# Patient Record
Sex: FEMALE | Race: WHITE | NOT HISPANIC OR LATINO
[De-identification: names, ages, dates, MRNs, and addresses within clinical notes are randomized per-mention and may not be internally consistent; named-entity substitution may affect disease eponyms.]

---

## 2017-08-24 PROBLEM — Z00.00 ENCOUNTER FOR PREVENTIVE HEALTH EXAMINATION: Status: ACTIVE | Noted: 2017-08-24

## 2017-08-25 ENCOUNTER — APPOINTMENT (OUTPATIENT)
Dept: GASTROENTEROLOGY | Facility: CLINIC | Age: 82
End: 2017-08-25
Payer: MEDICARE

## 2017-08-25 VITALS
DIASTOLIC BLOOD PRESSURE: 72 MMHG | HEIGHT: 63 IN | TEMPERATURE: 98.1 F | BODY MASS INDEX: 25.69 KG/M2 | WEIGHT: 145 LBS | SYSTOLIC BLOOD PRESSURE: 130 MMHG

## 2017-08-25 DIAGNOSIS — Z63.4 DISAPPEARANCE AND DEATH OF FAMILY MEMBER: ICD-10-CM

## 2017-08-25 DIAGNOSIS — Z80.6 FAMILY HISTORY OF LEUKEMIA: ICD-10-CM

## 2017-08-25 DIAGNOSIS — M48.00 SPINAL STENOSIS, SITE UNSPECIFIED: ICD-10-CM

## 2017-08-25 DIAGNOSIS — Z86.39 PERSONAL HISTORY OF OTHER ENDOCRINE, NUTRITIONAL AND METABOLIC DISEASE: ICD-10-CM

## 2017-08-25 DIAGNOSIS — Z87.898 PERSONAL HISTORY OF OTHER SPECIFIED CONDITIONS: ICD-10-CM

## 2017-08-25 DIAGNOSIS — Z87.39 PERSONAL HISTORY OF OTHER DISEASES OF THE MUSCULOSKELETAL SYSTEM AND CONNECTIVE TISSUE: ICD-10-CM

## 2017-08-25 DIAGNOSIS — I25.10 ATHEROSCLEROTIC HEART DISEASE OF NATIVE CORONARY ARTERY W/OUT ANGINA PECTORIS: ICD-10-CM

## 2017-08-25 DIAGNOSIS — Z86.79 PERSONAL HISTORY OF OTHER DISEASES OF THE CIRCULATORY SYSTEM: ICD-10-CM

## 2017-08-25 DIAGNOSIS — Z87.440 PERSONAL HISTORY OF URINARY (TRACT) INFECTIONS: ICD-10-CM

## 2017-08-25 DIAGNOSIS — Z78.9 OTHER SPECIFIED HEALTH STATUS: ICD-10-CM

## 2017-08-25 PROCEDURE — 99204 OFFICE O/P NEW MOD 45 MIN: CPT

## 2017-08-25 RX ORDER — LORATADINE 5 MG
17 TABLET,CHEWABLE ORAL
Refills: 0 | Status: ACTIVE | COMMUNITY

## 2017-08-25 RX ORDER — PSYLLIUM SEED
PACKET (EA) ORAL
Refills: 0 | Status: ACTIVE | COMMUNITY

## 2017-08-25 RX ORDER — SODIUM PHOSPHATE, DIBASIC AND SODIUM PHOSPHATE, MONOBASIC 7; 19 G/230ML; G/230ML
ENEMA RECTAL
Refills: 0 | Status: ACTIVE | COMMUNITY

## 2017-08-25 SDOH — SOCIAL STABILITY - SOCIAL INSECURITY: DISSAPEARANCE AND DEATH OF FAMILY MEMBER: Z63.4

## 2017-09-11 ENCOUNTER — APPOINTMENT (OUTPATIENT)
Dept: GASTROENTEROLOGY | Facility: CLINIC | Age: 82
End: 2017-09-11
Payer: MEDICARE

## 2017-09-11 VITALS — TEMPERATURE: 98 F | HEIGHT: 63 IN | DIASTOLIC BLOOD PRESSURE: 80 MMHG | SYSTOLIC BLOOD PRESSURE: 160 MMHG

## 2017-09-11 PROCEDURE — 99214 OFFICE O/P EST MOD 30 MIN: CPT

## 2017-09-18 ENCOUNTER — APPOINTMENT (OUTPATIENT)
Dept: GASTROENTEROLOGY | Facility: CLINIC | Age: 82
End: 2017-09-18
Payer: MEDICARE

## 2017-09-18 VITALS
TEMPERATURE: 98.4 F | SYSTOLIC BLOOD PRESSURE: 142 MMHG | HEIGHT: 63 IN | WEIGHT: 145 LBS | BODY MASS INDEX: 25.69 KG/M2 | DIASTOLIC BLOOD PRESSURE: 70 MMHG

## 2017-09-18 DIAGNOSIS — K56.41 FECAL IMPACTION: ICD-10-CM

## 2017-09-18 DIAGNOSIS — R19.5 OTHER FECAL ABNORMALITIES: ICD-10-CM

## 2017-09-18 DIAGNOSIS — Z78.9 OTHER SPECIFIED HEALTH STATUS: ICD-10-CM

## 2017-09-18 DIAGNOSIS — K59.09 OTHER CONSTIPATION: ICD-10-CM

## 2017-09-18 DIAGNOSIS — R63.0 ANOREXIA: ICD-10-CM

## 2017-09-18 PROCEDURE — 99213 OFFICE O/P EST LOW 20 MIN: CPT

## 2017-09-18 RX ORDER — METOPROLOL TARTRATE 25 MG/1
25 TABLET, FILM COATED ORAL
Refills: 0 | Status: ACTIVE | COMMUNITY

## 2017-09-18 RX ORDER — LISINOPRIL 10 MG/1
10 TABLET ORAL
Refills: 0 | Status: ACTIVE | COMMUNITY

## 2017-09-18 RX ORDER — PRASUGREL HYDROCHLORIDE 5 MG/1
5 TABLET, COATED ORAL
Refills: 0 | Status: ACTIVE | COMMUNITY

## 2017-09-18 RX ORDER — MELOXICAM 15 MG/1
15 TABLET ORAL
Refills: 0 | Status: ACTIVE | COMMUNITY

## 2017-09-18 RX ORDER — ASPIRIN 81 MG/1
81 TABLET, COATED ORAL
Refills: 0 | Status: ACTIVE | COMMUNITY

## 2017-10-02 ENCOUNTER — OTHER (OUTPATIENT)
Age: 82
End: 2017-10-02

## 2017-10-04 ENCOUNTER — OTHER (OUTPATIENT)
Age: 82
End: 2017-10-04

## 2017-10-16 ENCOUNTER — APPOINTMENT (OUTPATIENT)
Dept: GASTROENTEROLOGY | Facility: CLINIC | Age: 82
End: 2017-10-16

## 2018-02-15 ENCOUNTER — APPOINTMENT (OUTPATIENT)
Dept: UROGYNECOLOGY | Facility: CLINIC | Age: 83
End: 2018-02-15

## 2018-07-28 PROBLEM — R19.5 CHANGE IN STOOL: Status: ACTIVE | Noted: 2017-08-25

## 2019-02-19 ENCOUNTER — APPOINTMENT (OUTPATIENT)
Dept: VASCULAR SURGERY | Facility: CLINIC | Age: 84
End: 2019-02-19

## 2019-02-21 ENCOUNTER — EMERGENCY (EMERGENCY)
Facility: HOSPITAL | Age: 84
LOS: 1 days | Discharge: HOME | End: 2019-02-21
Attending: EMERGENCY MEDICINE
Payer: MEDICARE

## 2019-02-21 VITALS
RESPIRATION RATE: 15 BRPM | DIASTOLIC BLOOD PRESSURE: 77 MMHG | TEMPERATURE: 98 F | HEART RATE: 71 BPM | WEIGHT: 160.06 LBS | OXYGEN SATURATION: 100 % | HEIGHT: 62 IN | SYSTOLIC BLOOD PRESSURE: 154 MMHG

## 2019-02-21 PROCEDURE — 87186 SC STD MICRODIL/AGAR DIL: CPT

## 2019-02-21 PROCEDURE — 99283 EMERGENCY DEPT VISIT LOW MDM: CPT

## 2019-02-21 PROCEDURE — 99283 EMERGENCY DEPT VISIT LOW MDM: CPT | Mod: 25

## 2019-02-21 PROCEDURE — 87086 URINE CULTURE/COLONY COUNT: CPT

## 2019-02-21 PROCEDURE — 51702 INSERT TEMP BLADDER CATH: CPT

## 2019-02-21 PROCEDURE — 81001 URINALYSIS AUTO W/SCOPE: CPT

## 2019-02-21 NOTE — ED PROVIDER NOTE - PMH
CAD (coronary artery disease)    DM (diabetes mellitus)    Rosario catheter in place    HTN (hypertension)    OA (osteoarthritis)

## 2019-02-21 NOTE — ED PROVIDER NOTE - NS ED ROS FT
CONSTITUTIONAL: no fever, no chills   EYES: no visual changes, no eye pain   ENMT: no nasal congestion, no throat pain  CARDIOVASCULAR: no chest pain, no edema, no palpitations   RESPIRATORY: no shortness of breath, no cough   GASTROINTESTINAL: +abdominal pain, no nausea, no vomiting, no diarrhea, no constipation   GENITOURINARY: no dysuria, no frequency, +urgency   MUSCULOSKELETAL: no joint pains, no myalgias, no back pain   SKIN: no rashes  NEUROLOGICAL: no weakness, no headache, no dizziness, no slurred speech, no syncope   PSYCHIATRIC: no known mental health illness   HEME/LYMPH: no lymphadenopathy      All other ROS negative except as per HPI

## 2019-02-21 NOTE — ED PROVIDER NOTE - PHYSICAL EXAMINATION
GENERAL: wells appearing, no acute distress   HEAD: atraumatic   EYES: EOMI, pink conjunctiva   ENT: moist oral mucosa   CARDIAC: RRR, no edema, distal pulses present   RESPIRATORY: lungs CTAB, no increased work of breathing   GASTROINTESTINAL: +mild suprapubic tenderness, no rebound or guarding, bowel sounds presents  GENITOURINARY: no CVA tenderness; Rosario cath in place with yellow urine; no clots or blood   MUSCULOSKELETAL: no deformity   NEUROLOGICAL: AAOx3, spontaneous movement of extremities   SKIN: intact   PSYCHIATRIC: cooperative  HEME LYMPH: no lymphadenopathy

## 2019-02-21 NOTE — ED ADULT NURSE NOTE - OBJECTIVE STATEMENT
The patient presents with suprapubic pain since yesterday night.  Suprapubic tenderness noted.  No signs of infection.

## 2019-02-21 NOTE — ED PROVIDER NOTE - OBJECTIVE STATEMENT
85 y/o F patient with a significant PMHx of CAD with stents, DM, Rosario catheter in place, HTN, OA, and no significant PSHx presenting with clogged Rosario since x9pm last night associated with urinary urgency and lower abdominal pain. Patient denies nausea, vomiting, fever, back pain, and any other complaints.

## 2019-02-21 NOTE — ED ADULT TRIAGE NOTE - CHIEF COMPLAINT QUOTE
biba with c/o chronic urinary retention,came in with stewart catheter and its clogged since10 pm today with pain to lower abdomen as per ems

## 2019-02-21 NOTE — ED ADULT NURSE NOTE - NSIMPLEMENTINTERV_GEN_ALL_ED
Implemented All Universal Safety Interventions:  Busy to call system. Call bell, personal items and telephone within reach. Instruct patient to call for assistance. Room bathroom lighting operational. Non-slip footwear when patient is off stretcher. Physically safe environment: no spills, clutter or unnecessary equipment. Stretcher in lowest position, wheels locked, appropriate side rails in place.

## 2019-02-21 NOTE — ED PROVIDER NOTE - PROGRESS NOTE DETAILS
Rosario cath replaced and given leg bag. Pt feeling much better. Abd now soft and nontender.   UA - possible UTI (although may be colonized in the setting of chronic Rosario cath); will need f/u of urine culture  Will fu with PCP and urology. Discussed indications for patient return to ED. Patient understood.

## 2019-02-21 NOTE — ED PROVIDER NOTE - CLINICAL SUMMARY MEDICAL DECISION MAKING FREE TEXT BOX
85 y/o F with clogged Rosario catheter, place new catheter, send UA and reassess. 85 y/o F with clogged Rosario catheter. Will replace catheter, send UA, and reassess.

## 2019-02-23 LAB
-  AMIKACIN: SIGNIFICANT CHANGE UP
-  AMPICILLIN/SULBACTAM: SIGNIFICANT CHANGE UP
-  AMPICILLIN: SIGNIFICANT CHANGE UP
-  AZTREONAM: SIGNIFICANT CHANGE UP
-  CEFAZOLIN: SIGNIFICANT CHANGE UP
-  CEFEPIME: SIGNIFICANT CHANGE UP
-  CEFOXITIN: SIGNIFICANT CHANGE UP
-  CEFTRIAXONE: SIGNIFICANT CHANGE UP
-  CIPROFLOXACIN: SIGNIFICANT CHANGE UP
-  ERTAPENEM: SIGNIFICANT CHANGE UP
-  GENTAMICIN: SIGNIFICANT CHANGE UP
-  IMIPENEM: SIGNIFICANT CHANGE UP
-  LEVOFLOXACIN: SIGNIFICANT CHANGE UP
-  MEROPENEM: SIGNIFICANT CHANGE UP
-  NITROFURANTOIN: SIGNIFICANT CHANGE UP
-  PIPERACILLIN/TAZOBACTAM: SIGNIFICANT CHANGE UP
-  TIGECYCLINE: SIGNIFICANT CHANGE UP
-  TOBRAMYCIN: SIGNIFICANT CHANGE UP
-  TRIMETHOPRIM/SULFAMETHOXAZOLE: SIGNIFICANT CHANGE UP
CULTURE RESULTS: SIGNIFICANT CHANGE UP
METHOD TYPE: SIGNIFICANT CHANGE UP
ORGANISM # SPEC MICROSCOPIC CNT: SIGNIFICANT CHANGE UP
SPECIMEN SOURCE: SIGNIFICANT CHANGE UP

## 2019-03-14 ENCOUNTER — EMERGENCY (EMERGENCY)
Facility: HOSPITAL | Age: 84
LOS: 1 days | Discharge: HOME | End: 2019-03-14
Attending: EMERGENCY MEDICINE
Payer: MEDICARE

## 2019-03-14 VITALS
OXYGEN SATURATION: 98 % | WEIGHT: 164.91 LBS | HEIGHT: 61 IN | TEMPERATURE: 98 F | SYSTOLIC BLOOD PRESSURE: 170 MMHG | HEART RATE: 65 BPM | RESPIRATION RATE: 18 BRPM | DIASTOLIC BLOOD PRESSURE: 87 MMHG

## 2019-03-14 DIAGNOSIS — Z95.5 PRESENCE OF CORONARY ANGIOPLASTY IMPLANT AND GRAFT: Chronic | ICD-10-CM

## 2019-03-14 LAB
APPEARANCE UR: CLEAR — SIGNIFICANT CHANGE UP
BACTERIA # UR AUTO: ABNORMAL /HPF
BILIRUB UR-MCNC: NEGATIVE — SIGNIFICANT CHANGE UP
COLOR SPEC: YELLOW — SIGNIFICANT CHANGE UP
DIFF PNL FLD: NEGATIVE — SIGNIFICANT CHANGE UP
EPI CELLS # UR: ABNORMAL /HPF
GLUCOSE UR QL: NEGATIVE — SIGNIFICANT CHANGE UP
KETONES UR-MCNC: NEGATIVE — SIGNIFICANT CHANGE UP
LEUKOCYTE ESTERASE UR-ACNC: ABNORMAL
NITRITE UR-MCNC: POSITIVE
PH UR: 5 — SIGNIFICANT CHANGE UP (ref 5–8)
PROT UR-MCNC: NEGATIVE — SIGNIFICANT CHANGE UP
RBC CASTS # UR COMP ASSIST: SIGNIFICANT CHANGE UP /HPF (ref 0–2)
SP GR SPEC: 1.01 — SIGNIFICANT CHANGE UP (ref 1.01–1.02)
UROBILINOGEN FLD QL: NEGATIVE — SIGNIFICANT CHANGE UP
WBC UR QL: SIGNIFICANT CHANGE UP /HPF (ref 0–5)

## 2019-03-14 PROCEDURE — 87186 SC STD MICRODIL/AGAR DIL: CPT

## 2019-03-14 PROCEDURE — 99283 EMERGENCY DEPT VISIT LOW MDM: CPT

## 2019-03-14 PROCEDURE — 51702 INSERT TEMP BLADDER CATH: CPT

## 2019-03-14 PROCEDURE — 81001 URINALYSIS AUTO W/SCOPE: CPT

## 2019-03-14 PROCEDURE — 99283 EMERGENCY DEPT VISIT LOW MDM: CPT | Mod: 25

## 2019-03-14 PROCEDURE — 87086 URINE CULTURE/COLONY COUNT: CPT

## 2019-03-14 RX ORDER — CEFUROXIME AXETIL 250 MG
1 TABLET ORAL
Qty: 14 | Refills: 0
Start: 2019-03-14 | End: 2019-03-20

## 2019-03-14 RX ORDER — CEFUROXIME AXETIL 250 MG
250 TABLET ORAL ONCE
Refills: 0 | Status: COMPLETED | OUTPATIENT
Start: 2019-03-14 | End: 2019-03-14

## 2019-03-14 RX ADMIN — Medication 250 MILLIGRAM(S): at 21:55

## 2019-03-14 NOTE — ED ADULT NURSE NOTE - NSIMPLEMENTINTERV_GEN_ALL_ED
Implemented All Universal Safety Interventions:  [place] to call system. Call bell, personal items and telephone within reach. Instruct patient to call for assistance. Room bathroom lighting operational. Non-slip footwear when patient is off stretcher. Physically safe environment: no spills, clutter or unnecessary equipment. Stretcher in lowest position, wheels locked, appropriate side rails in place.

## 2019-03-14 NOTE — ED ADULT NURSE NOTE - OBJECTIVE STATEMENT
Patient complain of no urine output since 1600PM today Patient complain of no urine output since 1600PM today  stewart catheter inserted in [facility] ED 2 weeks ago, upon assessment, stewart catheter tip was out.

## 2019-03-14 NOTE — ED PROVIDER NOTE - PROGRESS NOTE DETAILS
pt with poss UTI, will treat with Ceftin, will check urine c/s tomorrow, if neg bacteria, will call pt to d/c Ab.

## 2019-03-14 NOTE — ED PROVIDER NOTE - OBJECTIVE STATEMENT
85 y/o F patient with a significant PMHx of CAD, DM, Rosario catheter in place, HTN, OA and significant PSHx of stented Coronary artery presents to the ED with her son c/o dislodged Rosario. Patient's son states that the patient may have pulled out the Rosario while she was trying to sit down after moving. Patient is wheelchair bound. Patient denies any fever, vomiting, pain, or any other complains. NKDA.

## 2019-03-16 RX ORDER — NITROFURANTOIN MACROCRYSTAL 50 MG
1 CAPSULE ORAL
Qty: 14 | Refills: 0
Start: 2019-03-16

## 2019-03-16 NOTE — ED POST DISCHARGE NOTE - DETAILS
Discussed Abx with patient's daughter. Informed daughter that patient should DC Ceftin and start macrobid. Culture discussed with ID attending Dr. Clark

## 2019-07-31 ENCOUNTER — EMERGENCY (EMERGENCY)
Facility: HOSPITAL | Age: 84
LOS: 1 days | Discharge: HOME | End: 2019-07-31
Attending: EMERGENCY MEDICINE
Payer: MEDICARE

## 2019-07-31 VITALS
HEART RATE: 60 BPM | RESPIRATION RATE: 17 BRPM | DIASTOLIC BLOOD PRESSURE: 58 MMHG | SYSTOLIC BLOOD PRESSURE: 165 MMHG | TEMPERATURE: 97 F | OXYGEN SATURATION: 100 %

## 2019-07-31 VITALS
WEIGHT: 160.06 LBS | HEIGHT: 63 IN | RESPIRATION RATE: 16 BRPM | OXYGEN SATURATION: 97 % | TEMPERATURE: 98 F | DIASTOLIC BLOOD PRESSURE: 76 MMHG | SYSTOLIC BLOOD PRESSURE: 144 MMHG | HEART RATE: 61 BPM

## 2019-07-31 DIAGNOSIS — Z95.5 PRESENCE OF CORONARY ANGIOPLASTY IMPLANT AND GRAFT: Chronic | ICD-10-CM

## 2019-07-31 LAB
APPEARANCE UR: CLEAR — SIGNIFICANT CHANGE UP
BILIRUB UR-MCNC: NEGATIVE — SIGNIFICANT CHANGE UP
COLOR SPEC: YELLOW — SIGNIFICANT CHANGE UP
DIFF PNL FLD: NEGATIVE — SIGNIFICANT CHANGE UP
GLUCOSE UR QL: NEGATIVE — SIGNIFICANT CHANGE UP
KETONES UR-MCNC: NEGATIVE — SIGNIFICANT CHANGE UP
LEUKOCYTE ESTERASE UR-ACNC: ABNORMAL
NITRITE UR-MCNC: POSITIVE
PH UR: 6 — SIGNIFICANT CHANGE UP (ref 5–8)
PROT UR-MCNC: NEGATIVE — SIGNIFICANT CHANGE UP
SP GR SPEC: 1 — LOW (ref 1.01–1.02)
UROBILINOGEN FLD QL: NEGATIVE — SIGNIFICANT CHANGE UP

## 2019-07-31 PROCEDURE — 87186 SC STD MICRODIL/AGAR DIL: CPT

## 2019-07-31 PROCEDURE — 99283 EMERGENCY DEPT VISIT LOW MDM: CPT | Mod: 25

## 2019-07-31 PROCEDURE — 87086 URINE CULTURE/COLONY COUNT: CPT

## 2019-07-31 PROCEDURE — 51702 INSERT TEMP BLADDER CATH: CPT

## 2019-07-31 PROCEDURE — 99284 EMERGENCY DEPT VISIT MOD MDM: CPT

## 2019-07-31 PROCEDURE — 81001 URINALYSIS AUTO W/SCOPE: CPT

## 2019-07-31 RX ORDER — CEFUROXIME AXETIL 250 MG
250 TABLET ORAL ONCE
Refills: 0 | Status: COMPLETED | OUTPATIENT
Start: 2019-07-31 | End: 2019-07-31

## 2019-07-31 RX ORDER — CEFUROXIME AXETIL 250 MG
250 TABLET ORAL ONCE
Refills: 0 | Status: DISCONTINUED | OUTPATIENT
Start: 2019-07-31 | End: 2019-08-12

## 2019-07-31 RX ORDER — CEFUROXIME AXETIL 250 MG
1 TABLET ORAL
Qty: 6 | Refills: 0
Start: 2019-07-31 | End: 2019-08-02

## 2019-07-31 RX ADMIN — Medication 250 MILLIGRAM(S): at 04:13

## 2019-07-31 NOTE — ED PROVIDER NOTE - NSFOLLOWUPCLINICS_GEN_ALL_ED_FT
[facility]  [facility]  [address] - [address] [place] [address]  Phone: [phone]  Fax: [phone]  Follow Up Time:

## 2019-07-31 NOTE — ED PROVIDER NOTE - CLINICAL SUMMARY MEDICAL DECISION MAKING FREE TEXT BOX
Pt in no distress, stewart cath functional Rx Ceftin x 3 days due to frequent instrumentation of stewart placement. Pt is well appearing walking with normal gait, stable for discharge and follow up with medical doctor. Pt educated on care and need for follow up. Discussed anticipatory guidance and return precautions. Questions answered. I had a detailed discussion with the patient and/or guardian regarding the historical points, exam findings, and any diagnostic results supporting the discharge diagnosis.

## 2019-07-31 NOTE — ED PROVIDER NOTE - NSFOLLOWUPINSTRUCTIONS_ED_ALL_ED_FT
Return if fever, pain, difficulty urinating any concerns.  See your doctor as soon as possible (within 1-2 days).   If you need further assistance for appointments you can contact the [facility] Care Coordinator at [phone]. In addition our outpatient [facility] is located at [address] [place] [address], tel: [phone].

## 2019-07-31 NOTE — ED ADULT NURSE NOTE - OBJECTIVE STATEMENT
The patient presents with leaking Rosario catheter.  Lower abd tenderness palpated.  No other symptoms.

## 2019-07-31 NOTE — ED PROVIDER NOTE - OBJECTIVE STATEMENT
Patient is a 85 y.o. female with a significant PMHx of CAD s/p stent, diabetes, Rosario catheter in place, HTN, and OA presents to the ED with dislodged Rosario catheter. 16 Kiswahili changed in ED with 400cc clear yellow urine. Denies fever or pain. Not currently on antibiotics. NKDA.

## 2019-10-24 ENCOUNTER — EMERGENCY (EMERGENCY)
Facility: HOSPITAL | Age: 84
LOS: 1 days | Discharge: HOME | End: 2019-10-24
Attending: EMERGENCY MEDICINE
Payer: MEDICARE

## 2019-10-24 VITALS
DIASTOLIC BLOOD PRESSURE: 80 MMHG | SYSTOLIC BLOOD PRESSURE: 170 MMHG | HEART RATE: 60 BPM | TEMPERATURE: 97 F | HEIGHT: 63 IN | RESPIRATION RATE: 17 BRPM | WEIGHT: 175.05 LBS | OXYGEN SATURATION: 97 %

## 2019-10-24 DIAGNOSIS — Z95.5 PRESENCE OF CORONARY ANGIOPLASTY IMPLANT AND GRAFT: Chronic | ICD-10-CM

## 2019-10-24 PROCEDURE — 99283 EMERGENCY DEPT VISIT LOW MDM: CPT

## 2019-10-24 NOTE — ED ADULT NURSE NOTE - OBJECTIVE STATEMENT
pt BIBA due to Stewart catheter not draining. As per pt, the stewart was just changed by home RN today. Denies fever, nausea, and vomiting.

## 2019-10-24 NOTE — ED PROVIDER NOTE - PATIENT PORTAL LINK FT
You can access the FollowMyHealth Patient Portal offered by [facility] by registering at the following website: [email or web address]. By joining [employer]’s FollowMyHealth portal, you will also be able to view your health information using other applications (apps) compatible with our system.

## 2019-10-24 NOTE — ED ADULT NURSE NOTE - CHIEF COMPLAINT QUOTE
biba with c/o blocked stewart catheter,was inserted at 5pm and since then there was no urine in the bag as per ems

## 2019-10-24 NOTE — ED PROVIDER NOTE - CLINICAL SUMMARY MEDICAL DECISION MAKING FREE TEXT BOX
85 year old female stewart not draining. vitals WNL. PE as above.  stewart was dislodged. replaced. good drainage. will dc. f/u PMD. return precautions given.

## 2019-10-24 NOTE — ED PROVIDER NOTE - OBJECTIVE STATEMENT
85 year old female PMh CAD with stent, DM, chronic stewart, HTN, OA coming in for stewart not draining. states had it replaced today for routine replacement and it stopped draining tonight. states thinks something happened when she moved to get out of bed. denies all complaints at this time.

## 2019-10-24 NOTE — ED ADULT NURSE NOTE - NSIMPLEMENTINTERV_GEN_ALL_ED
Implemented All Fall Risk Interventions:  [place] to call system. Call bell, personal items and telephone within reach. Instruct patient to call for assistance. Room bathroom lighting operational. Non-slip footwear when patient is off stretcher. Physically safe environment: no spills, clutter or unnecessary equipment. Stretcher in lowest position, wheels locked, appropriate side rails in place. Provide visual cue, wrist band, yellow gown, etc. Monitor gait and stability. Monitor for mental status changes and reorient to person, place, and time. Review medications for side effects contributing to fall risk. Reinforce activity limits and safety measures with patient and family.

## 2019-11-05 ENCOUNTER — INPATIENT (INPATIENT)
Facility: HOSPITAL | Age: 84
LOS: 3 days | Discharge: HOME | DRG: 699 | End: 2019-11-09
Attending: INTERNAL MEDICINE | Admitting: INTERNAL MEDICINE
Payer: MEDICARE

## 2019-11-05 VITALS
TEMPERATURE: 98 F | DIASTOLIC BLOOD PRESSURE: 70 MMHG | RESPIRATION RATE: 16 BRPM | WEIGHT: 164.91 LBS | HEART RATE: 58 BPM | OXYGEN SATURATION: 99 % | SYSTOLIC BLOOD PRESSURE: 126 MMHG

## 2019-11-05 DIAGNOSIS — Z95.5 PRESENCE OF CORONARY ANGIOPLASTY IMPLANT AND GRAFT: Chronic | ICD-10-CM

## 2019-11-06 DIAGNOSIS — Z90.710 ACQUIRED ABSENCE OF BOTH CERVIX AND UTERUS: Chronic | ICD-10-CM

## 2019-11-06 DIAGNOSIS — N30.00 ACUTE CYSTITIS WITHOUT HEMATURIA: ICD-10-CM

## 2019-11-06 DIAGNOSIS — Z29.9 ENCOUNTER FOR PROPHYLACTIC MEASURES, UNSPECIFIED: ICD-10-CM

## 2019-11-06 DIAGNOSIS — I10 ESSENTIAL (PRIMARY) HYPERTENSION: ICD-10-CM

## 2019-11-06 DIAGNOSIS — Z71.89 OTHER SPECIFIED COUNSELING: ICD-10-CM

## 2019-11-06 DIAGNOSIS — M19.90 UNSPECIFIED OSTEOARTHRITIS, UNSPECIFIED SITE: ICD-10-CM

## 2019-11-06 DIAGNOSIS — N28.1 CYST OF KIDNEY, ACQUIRED: ICD-10-CM

## 2019-11-06 DIAGNOSIS — E11.9 TYPE 2 DIABETES MELLITUS WITHOUT COMPLICATIONS: ICD-10-CM

## 2019-11-06 DIAGNOSIS — K80.20 CALCULUS OF GALLBLADDER WITHOUT CHOLECYSTITIS WITHOUT OBSTRUCTION: ICD-10-CM

## 2019-11-06 LAB
ALBUMIN SERPL ELPH-MCNC: 3 G/DL — LOW (ref 3.5–5)
ALBUMIN SERPL ELPH-MCNC: 3.1 G/DL — LOW (ref 3.5–5)
ALP SERPL-CCNC: 88 U/L — SIGNIFICANT CHANGE UP (ref 40–120)
ALP SERPL-CCNC: 93 U/L — SIGNIFICANT CHANGE UP (ref 40–120)
ALT FLD-CCNC: 52 U/L DA — SIGNIFICANT CHANGE UP (ref 10–60)
ALT FLD-CCNC: 61 U/L DA — HIGH (ref 10–60)
ANION GAP SERPL CALC-SCNC: 6 MMOL/L — SIGNIFICANT CHANGE UP (ref 5–17)
ANION GAP SERPL CALC-SCNC: 8 MMOL/L — SIGNIFICANT CHANGE UP (ref 5–17)
APPEARANCE UR: CLEAR — SIGNIFICANT CHANGE UP
APTT BLD: 28.4 SEC — SIGNIFICANT CHANGE UP (ref 27.5–36.3)
AST SERPL-CCNC: 21 U/L — SIGNIFICANT CHANGE UP (ref 10–40)
AST SERPL-CCNC: 35 U/L — SIGNIFICANT CHANGE UP (ref 10–40)
BASOPHILS # BLD AUTO: 0.04 K/UL — SIGNIFICANT CHANGE UP (ref 0–0.2)
BASOPHILS NFR BLD AUTO: 0.2 % — SIGNIFICANT CHANGE UP (ref 0–2)
BILIRUB SERPL-MCNC: 0.6 MG/DL — SIGNIFICANT CHANGE UP (ref 0.2–1.2)
BILIRUB UR-MCNC: NEGATIVE — SIGNIFICANT CHANGE UP
BUN SERPL-MCNC: 30 MG/DL — HIGH (ref 7–18)
CALCIUM SERPL-MCNC: 9 MG/DL — SIGNIFICANT CHANGE UP (ref 8.4–10.5)
CALCIUM SERPL-MCNC: 9.1 MG/DL — SIGNIFICANT CHANGE UP (ref 8.4–10.5)
CHLORIDE SERPL-SCNC: 101 MMOL/L — SIGNIFICANT CHANGE UP (ref 96–108)
CHLORIDE SERPL-SCNC: 102 MMOL/L — SIGNIFICANT CHANGE UP (ref 96–108)
CO2 SERPL-SCNC: 26 MMOL/L — SIGNIFICANT CHANGE UP (ref 22–31)
CO2 SERPL-SCNC: 28 MMOL/L — SIGNIFICANT CHANGE UP (ref 22–31)
COLOR SPEC: YELLOW — SIGNIFICANT CHANGE UP
CREAT SERPL-MCNC: 1.18 MG/DL — SIGNIFICANT CHANGE UP (ref 0.5–1.3)
CREAT SERPL-MCNC: 1.33 MG/DL — HIGH (ref 0.5–1.3)
DIFF PNL FLD: ABNORMAL
EOSINOPHIL # BLD AUTO: 0.08 K/UL — SIGNIFICANT CHANGE UP (ref 0–0.5)
EOSINOPHIL NFR BLD AUTO: 0.4 % — SIGNIFICANT CHANGE UP (ref 0–6)
GLUCOSE BLDC GLUCOMTR-MCNC: 131 MG/DL — HIGH (ref 70–99)
GLUCOSE BLDC GLUCOMTR-MCNC: 145 MG/DL — HIGH (ref 70–99)
GLUCOSE SERPL-MCNC: 130 MG/DL — HIGH (ref 70–99)
GLUCOSE SERPL-MCNC: 150 MG/DL — HIGH (ref 70–99)
GLUCOSE UR QL: NEGATIVE — SIGNIFICANT CHANGE UP
HCT VFR BLD CALC: 38 % — SIGNIFICANT CHANGE UP (ref 34.5–45)
HCT VFR BLD CALC: 38.1 % — SIGNIFICANT CHANGE UP (ref 34.5–45)
HGB BLD-MCNC: 12.5 G/DL — SIGNIFICANT CHANGE UP (ref 11.5–15.5)
IMM GRANULOCYTES NFR BLD AUTO: 0.7 % — SIGNIFICANT CHANGE UP (ref 0–1.5)
INR BLD: 0.97 RATIO — SIGNIFICANT CHANGE UP (ref 0.88–1.16)
KETONES UR-MCNC: NEGATIVE — SIGNIFICANT CHANGE UP
LEUKOCYTE ESTERASE UR-ACNC: ABNORMAL
LYMPHOCYTES # BLD AUTO: 1.47 K/UL — SIGNIFICANT CHANGE UP (ref 1–3.3)
LYMPHOCYTES # BLD AUTO: 8 % — LOW (ref 13–44)
MAGNESIUM SERPL-MCNC: 2.3 MG/DL — SIGNIFICANT CHANGE UP (ref 1.6–2.6)
MCHC RBC-ENTMCNC: 30.1 PG — SIGNIFICANT CHANGE UP (ref 27–34)
MCHC RBC-ENTMCNC: 30.3 PG — SIGNIFICANT CHANGE UP (ref 27–34)
MCHC RBC-ENTMCNC: 32.8 GM/DL — SIGNIFICANT CHANGE UP (ref 32–36)
MCHC RBC-ENTMCNC: 32.9 GM/DL — SIGNIFICANT CHANGE UP (ref 32–36)
MCV RBC AUTO: 91.8 FL — SIGNIFICANT CHANGE UP (ref 80–100)
MCV RBC AUTO: 92 FL — SIGNIFICANT CHANGE UP (ref 80–100)
MONOCYTES # BLD AUTO: 1.32 K/UL — HIGH (ref 0–0.9)
MONOCYTES NFR BLD AUTO: 7.2 % — SIGNIFICANT CHANGE UP (ref 2–14)
NEUTROPHILS # BLD AUTO: 15.32 K/UL — HIGH (ref 1.8–7.4)
NEUTROPHILS NFR BLD AUTO: 83.5 % — HIGH (ref 43–77)
NITRITE UR-MCNC: NEGATIVE — SIGNIFICANT CHANGE UP
NRBC # BLD: 0 /100 WBCS — SIGNIFICANT CHANGE UP (ref 0–0)
PH UR: 6 — SIGNIFICANT CHANGE UP (ref 5–8)
PHOSPHATE SERPL-MCNC: 3.6 MG/DL — SIGNIFICANT CHANGE UP (ref 2.5–4.5)
PLATELET # BLD AUTO: 297 K/UL — SIGNIFICANT CHANGE UP (ref 150–400)
PLATELET # BLD AUTO: 351 K/UL — SIGNIFICANT CHANGE UP (ref 150–400)
POTASSIUM SERPL-MCNC: 4.9 MMOL/L — SIGNIFICANT CHANGE UP (ref 3.5–5.3)
POTASSIUM SERPL-MCNC: 5.3 MMOL/L — SIGNIFICANT CHANGE UP (ref 3.5–5.3)
POTASSIUM SERPL-SCNC: 4.9 MMOL/L — SIGNIFICANT CHANGE UP (ref 3.5–5.3)
POTASSIUM SERPL-SCNC: 5.3 MMOL/L — SIGNIFICANT CHANGE UP (ref 3.5–5.3)
PROT SERPL-MCNC: 6.8 G/DL — SIGNIFICANT CHANGE UP (ref 6–8.3)
PROT SERPL-MCNC: 7.2 G/DL — SIGNIFICANT CHANGE UP (ref 6–8.3)
PROT UR-MCNC: 15
PROTHROM AB SERPL-ACNC: 10.7 SEC — SIGNIFICANT CHANGE UP (ref 10–12.9)
RBC # BLD: 4.13 M/UL — SIGNIFICANT CHANGE UP (ref 3.8–5.2)
RBC # BLD: 4.15 M/UL — SIGNIFICANT CHANGE UP (ref 3.8–5.2)
RBC # FLD: 14.6 % — HIGH (ref 10.3–14.5)
RBC # FLD: 14.7 % — HIGH (ref 10.3–14.5)
SODIUM SERPL-SCNC: 135 MMOL/L — SIGNIFICANT CHANGE UP (ref 135–145)
SODIUM SERPL-SCNC: 136 MMOL/L — SIGNIFICANT CHANGE UP (ref 135–145)
SP GR SPEC: 1 — LOW (ref 1.01–1.02)
TROPONIN I SERPL-MCNC: <0.015 NG/ML — SIGNIFICANT CHANGE UP (ref 0–0.04)
UROBILINOGEN FLD QL: NEGATIVE — SIGNIFICANT CHANGE UP
WBC # BLD: 16.51 K/UL — HIGH (ref 3.8–10.5)
WBC # BLD: 18.35 K/UL — HIGH (ref 3.8–10.5)
WBC # FLD AUTO: 16.51 K/UL — HIGH (ref 3.8–10.5)
WBC # FLD AUTO: 18.35 K/UL — HIGH (ref 3.8–10.5)

## 2019-11-06 PROCEDURE — 74177 CT ABD & PELVIS W/CONTRAST: CPT | Mod: 26

## 2019-11-06 PROCEDURE — 99222 1ST HOSP IP/OBS MODERATE 55: CPT

## 2019-11-06 PROCEDURE — 76705 ECHO EXAM OF ABDOMEN: CPT | Mod: 26

## 2019-11-06 PROCEDURE — 99223 1ST HOSP IP/OBS HIGH 75: CPT | Mod: AI,GC

## 2019-11-06 PROCEDURE — 71045 X-RAY EXAM CHEST 1 VIEW: CPT | Mod: 26

## 2019-11-06 PROCEDURE — 99285 EMERGENCY DEPT VISIT HI MDM: CPT

## 2019-11-06 RX ORDER — ONDANSETRON 8 MG/1
4 TABLET, FILM COATED ORAL EVERY 8 HOURS
Refills: 0 | Status: DISCONTINUED | OUTPATIENT
Start: 2019-11-06 | End: 2019-11-09

## 2019-11-06 RX ORDER — SODIUM CHLORIDE 9 MG/ML
1000 INJECTION, SOLUTION INTRAVENOUS
Refills: 0 | Status: DISCONTINUED | OUTPATIENT
Start: 2019-11-06 | End: 2019-11-09

## 2019-11-06 RX ORDER — CHLORHEXIDINE GLUCONATE 213 G/1000ML
1 SOLUTION TOPICAL ONCE
Refills: 0 | Status: COMPLETED | OUTPATIENT
Start: 2019-11-06 | End: 2019-11-07

## 2019-11-06 RX ORDER — POLYETHYLENE GLYCOL 3350 17 G/17G
17 POWDER, FOR SOLUTION ORAL ONCE
Refills: 0 | Status: DISCONTINUED | OUTPATIENT
Start: 2019-11-06 | End: 2019-11-09

## 2019-11-06 RX ORDER — METOPROLOL TARTRATE 50 MG
25 TABLET ORAL DAILY
Refills: 0 | Status: DISCONTINUED | OUTPATIENT
Start: 2019-11-06 | End: 2019-11-09

## 2019-11-06 RX ORDER — CEFTRIAXONE 500 MG/1
1000 INJECTION, POWDER, FOR SOLUTION INTRAMUSCULAR; INTRAVENOUS EVERY 24 HOURS
Refills: 0 | Status: DISCONTINUED | OUTPATIENT
Start: 2019-11-07 | End: 2019-11-09

## 2019-11-06 RX ORDER — SODIUM CHLORIDE 9 MG/ML
1000 INJECTION, SOLUTION INTRAVENOUS
Refills: 0 | Status: DISCONTINUED | OUTPATIENT
Start: 2019-11-06 | End: 2019-11-06

## 2019-11-06 RX ORDER — CEFTRIAXONE 500 MG/1
1000 INJECTION, POWDER, FOR SOLUTION INTRAMUSCULAR; INTRAVENOUS ONCE
Refills: 0 | Status: COMPLETED | OUTPATIENT
Start: 2019-11-06 | End: 2019-11-06

## 2019-11-06 RX ORDER — KETOROLAC TROMETHAMINE 30 MG/ML
15 SYRINGE (ML) INJECTION EVERY 6 HOURS
Refills: 0 | Status: DISCONTINUED | OUTPATIENT
Start: 2019-11-06 | End: 2019-11-08

## 2019-11-06 RX ORDER — OXYCODONE AND ACETAMINOPHEN 5; 325 MG/1; MG/1
1 TABLET ORAL ONCE
Refills: 0 | Status: DISCONTINUED | OUTPATIENT
Start: 2019-11-06 | End: 2019-11-06

## 2019-11-06 RX ORDER — INSULIN LISPRO 100/ML
VIAL (ML) SUBCUTANEOUS EVERY 6 HOURS
Refills: 0 | Status: DISCONTINUED | OUTPATIENT
Start: 2019-11-06 | End: 2019-11-06

## 2019-11-06 RX ORDER — GABAPENTIN 400 MG/1
200 CAPSULE ORAL AT BEDTIME
Refills: 0 | Status: DISCONTINUED | OUTPATIENT
Start: 2019-11-06 | End: 2019-11-09

## 2019-11-06 RX ORDER — MORPHINE SULFATE 50 MG/1
0.5 CAPSULE, EXTENDED RELEASE ORAL EVERY 6 HOURS
Refills: 0 | Status: DISCONTINUED | OUTPATIENT
Start: 2019-11-06 | End: 2019-11-07

## 2019-11-06 RX ORDER — TRAMADOL HYDROCHLORIDE 50 MG/1
100 TABLET ORAL AT BEDTIME
Refills: 0 | Status: DISCONTINUED | OUTPATIENT
Start: 2019-11-06 | End: 2019-11-09

## 2019-11-06 RX ORDER — LISINOPRIL 2.5 MG/1
10 TABLET ORAL DAILY
Refills: 0 | Status: DISCONTINUED | OUTPATIENT
Start: 2019-11-06 | End: 2019-11-09

## 2019-11-06 RX ORDER — ONDANSETRON 8 MG/1
4 TABLET, FILM COATED ORAL ONCE
Refills: 0 | Status: COMPLETED | OUTPATIENT
Start: 2019-11-06 | End: 2019-11-06

## 2019-11-06 RX ORDER — ASCORBIC ACID 60 MG
500 TABLET,CHEWABLE ORAL THREE TIMES A DAY
Refills: 0 | Status: DISCONTINUED | OUTPATIENT
Start: 2019-11-06 | End: 2019-11-09

## 2019-11-06 RX ORDER — METRONIDAZOLE 500 MG
500 TABLET ORAL EVERY 8 HOURS
Refills: 0 | Status: DISCONTINUED | OUTPATIENT
Start: 2019-11-06 | End: 2019-11-09

## 2019-11-06 RX ORDER — METRONIDAZOLE 500 MG
TABLET ORAL
Refills: 0 | Status: DISCONTINUED | OUTPATIENT
Start: 2019-11-06 | End: 2019-11-09

## 2019-11-06 RX ORDER — INSULIN LISPRO 100/ML
VIAL (ML) SUBCUTANEOUS
Refills: 0 | Status: DISCONTINUED | OUTPATIENT
Start: 2019-11-06 | End: 2019-11-09

## 2019-11-06 RX ORDER — ENOXAPARIN SODIUM 100 MG/ML
40 INJECTION SUBCUTANEOUS DAILY
Refills: 0 | Status: DISCONTINUED | OUTPATIENT
Start: 2019-11-06 | End: 2019-11-09

## 2019-11-06 RX ORDER — KETOROLAC TROMETHAMINE 30 MG/ML
15 SYRINGE (ML) INJECTION ONCE
Refills: 0 | Status: DISCONTINUED | OUTPATIENT
Start: 2019-11-06 | End: 2019-11-06

## 2019-11-06 RX ORDER — ASPIRIN/CALCIUM CARB/MAGNESIUM 324 MG
81 TABLET ORAL DAILY
Refills: 0 | Status: DISCONTINUED | OUTPATIENT
Start: 2019-11-06 | End: 2019-11-09

## 2019-11-06 RX ORDER — CEFTRIAXONE 500 MG/1
INJECTION, POWDER, FOR SOLUTION INTRAMUSCULAR; INTRAVENOUS
Refills: 0 | Status: DISCONTINUED | OUTPATIENT
Start: 2019-11-06 | End: 2019-11-09

## 2019-11-06 RX ORDER — ACETAMINOPHEN 500 MG
650 TABLET ORAL EVERY 6 HOURS
Refills: 0 | Status: DISCONTINUED | OUTPATIENT
Start: 2019-11-06 | End: 2019-11-09

## 2019-11-06 RX ORDER — METRONIDAZOLE 500 MG
500 TABLET ORAL ONCE
Refills: 0 | Status: COMPLETED | OUTPATIENT
Start: 2019-11-06 | End: 2019-11-06

## 2019-11-06 RX ADMIN — Medication 100 MILLIGRAM(S): at 22:03

## 2019-11-06 RX ADMIN — GABAPENTIN 200 MILLIGRAM(S): 400 CAPSULE ORAL at 22:52

## 2019-11-06 RX ADMIN — OXYCODONE AND ACETAMINOPHEN 1 TABLET(S): 5; 325 TABLET ORAL at 18:23

## 2019-11-06 RX ADMIN — Medication 100 MILLIGRAM(S): at 12:22

## 2019-11-06 RX ADMIN — SODIUM CHLORIDE 60 MILLILITER(S): 9 INJECTION, SOLUTION INTRAVENOUS at 18:42

## 2019-11-06 RX ADMIN — SODIUM CHLORIDE 75 MILLILITER(S): 9 INJECTION, SOLUTION INTRAVENOUS at 18:10

## 2019-11-06 RX ADMIN — CEFTRIAXONE 100 MILLIGRAM(S): 500 INJECTION, POWDER, FOR SOLUTION INTRAMUSCULAR; INTRAVENOUS at 12:22

## 2019-11-06 RX ADMIN — CEFTRIAXONE 100 MILLIGRAM(S): 500 INJECTION, POWDER, FOR SOLUTION INTRAMUSCULAR; INTRAVENOUS at 05:45

## 2019-11-06 RX ADMIN — ONDANSETRON 4 MILLIGRAM(S): 8 TABLET, FILM COATED ORAL at 03:21

## 2019-11-06 RX ADMIN — OXYCODONE AND ACETAMINOPHEN 1 TABLET(S): 5; 325 TABLET ORAL at 19:37

## 2019-11-06 RX ADMIN — SODIUM CHLORIDE 75 MILLILITER(S): 9 INJECTION, SOLUTION INTRAVENOUS at 12:23

## 2019-11-06 NOTE — H&P ADULT - NSICDXPASTMEDICALHX_GEN_ALL_CORE_FT
PAST MEDICAL HISTORY:  CAD (coronary artery disease)     DM (diabetes mellitus)     Rosario catheter in place     HTN (hypertension)     OA (osteoarthritis)

## 2019-11-06 NOTE — H&P ADULT - PROBLEM SELECTOR PLAN 7
Goals of care discussed with patient  meaning of CPR described in detail after understanding risk associated with age.  Pt is DNR/DNI  MOLST form filled in chart pending attending signature IMPROVE VTE Individual Risk Assessment    RISK                                                                Points    [  ] Previous VTE                                                  3    [  ] Thrombophilia                                               2    [  ] Lower limb paralysis                                      2        (unable to hold up >15 seconds)    [  ] Current Cancer                                              2         (within 6 months)  [X] Immobilization > 24 hrs                                1  [  ] ICU/CCU stay > 24 hours                              1  [X] Age > 60                                                      1    IMPROVE VTE Score _____2____  c/w Lovenox 40 mg qd

## 2019-11-06 NOTE — H&P ADULT - PROBLEM SELECTOR PLAN 4
- Pt taking losartan and Metoprolol at home    - c/w losartan and Metoprolol  with parameters  - Monitor BP closely and adjust medications if clinically indicated.  - DASH diet.

## 2019-11-06 NOTE — H&P ADULT - PROBLEM SELECTOR PLAN 8
Goals of care discussed with patient  meaning of CPR described in detail after understanding risk associated with age.  Pt is DNR/DNI  MOLST form filled in chart pending attending signature

## 2019-11-06 NOTE — H&P ADULT - ATTENDING COMMENTS
Patient seen and examined with PGY2 MAR and MS4; Agree with PGY1 A/P above with editing as needed. My independent assessment, findings on exam, diagnosis and plan of care as listed below    Vital Signs Last 24 Hrs  T(C): 36.9 (06 Nov 2019 11:08), Max: 36.9 (06 Nov 2019 11:08)  T(F): 98.5 (06 Nov 2019 11:08), Max: 98.5 (06 Nov 2019 11:08)  HR: 71 (06 Nov 2019 11:08) (58 - 71)  BP: 151/67 (06 Nov 2019 11:08) (126/70 - 151/67)  BP(mean): --  RR: 17 (06 Nov 2019 11:08) (16 - 17)  SpO2: 100% (06 Nov 2019 11:08) (99% - 100%) Patient seen and examined with PGY2 MAR and MS4; Agree with PGY1 A/P above with editing as needed. My independent assessment, findings on exam, diagnosis and plan of care as listed below    Patient admitted with abdominal pain and vomiting. patient initially brought to ED for leaking stewart catheter. She has a chronic stewart which was removed.  She is followed by House calls MD. She also has long standing Constipation and diarrhea after laxatives. She was found to have fecal impaction and Stercoral colitis on CT. Patient does have some upper abdominal pain on right side as well as some suprapubic pain. As per patient vomited twice in ED.     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; Non smoker; No alcohol; Non contributory FH to current presentation    Vital Signs Last 24 Hrs  T(C): 36.9 (06 Nov 2019 11:08), Max: 36.9 (06 Nov 2019 11:08)  T(F): 98.5 (06 Nov 2019 11:08), Max: 98.5 (06 Nov 2019 11:08)  HR: 71 (06 Nov 2019 11:08) (58 - 71)  BP: 151/67 (06 Nov 2019 11:08) (126/70 - 151/67)  RR: 17 (06 Nov 2019 11:08) (16 - 17)  SpO2: 100% (06 Nov 2019 11:08) (99% - 100%)    P/E:  Neuro: AAO x3; No gross focal neurological defiicts  CVS: S1S2 present  Resp: BLAE+, No wheeze otr Rhonchi  GI: Soft, BS+, Tenderness RUQ on deep plapation  Extr; No edema or calf tenderness B/L LE    Labs:                        12.5   18.35 )-----------( 297      ( 06 Nov 2019 12:29 )             38.0   11-06    135  |  101  |  30<H>  ----------------------------<  150<H>  5.3   |  28  |  1.33<H>    Ca    9.0      06 Nov 2019 12:29  Phos  3.6     11-06  Mg     2.3     11-06    TPro  6.8  /  Alb  3.0<L>  /  TBili  0.6  /  DBili  x   /  AST  21  /  ALT  52  /  AlkPhos  93  11-06     CT Abdomen and Pelvis w/ IV Cont (11.06.19 @ 04:33)     IMPRESSION:   1. Marked fecal impaction within the rectosigmoid colon with suspected superimposed stercoral colitis.  2. 1.2 cm exophytic hyperdense lesion arising from the lower pole the left kidney. Additional indeterminate 1.6 cm right renal lesion. Further   evaluation with contrast-enhanced MR is recommended.  3. Intrahepatic ductal dilatation of uncertain etiology. Further evaluation with MRI/MRCP is advised.  4. Distended gallbladder with cholelithiasis and wall hyper enhancement.   Recommend correlation with ultrasound.    US Abdomen Limited (11.06.19 @ 13:14)     IMPRESSION: Cholelithiasis. No wall thickening or pericholecystic fluid. Distention seen on CT is not appreciated on the current study due to adjacent bowel gas and contraction with stones. Recommend DISIDA scan if acute cholecystitis is of clinical concern.    Right renal cyst.    D/D;  RUQ pain with Leucocytosis and Cholelithiasis concern for Cholecystitis  Possible Leucocytosis from UTI given chronic indwelling Stewart catheter  Abdominal Pain also possible from Fecal impaction  Type 2 DM  HTN    Plan:  Medicine; NPO until Sx eval; IV fluids; Zofran prn  Add Ceftriaxone and Flagyl for potential Cholecystitis and UTI coverage   Follow up Blood and Urine Cx send before Antibiotics given   HIDA scan if surgery recommends  If remain stable can advance to Full liquids.   patient is having loose stools now; If need be will give Miralax to achieve adequate BMs   PGY2 MAR spoke with Daughter over the phone and obtained Hx and updated findings and plan  I also spoke with Son at bedside this evening updated findings, possible etiology, Rx plan   Discussed with PGY2 MAR Dr. Lomeli and ROXIE Andersen.

## 2019-11-06 NOTE — PROGRESS NOTE ADULT - ASSESSMENT
A/P: 85y Female with cholelithiasis    - OR 11/7/19 for laparoscopic cholecystectomy with Dr. Prajapati  - NPO past midnight, except medications  - IVF while NPO  - IV abx  - Consent to be obtained  - Medical clearance for OR  - Cardiology clearance for OR A/P: 85y Female with cholelithiasis    - OR 11/7/19 for laparoscopic cholecystectomy with Dr. Prajapati  - NPO past midnight, except medications  - IVF while NPO  - IV abx  - chlorhexidene wipes  - Consent to be obtained  - Medical clearance for OR  - Cardiology clearance for OR A/P: 85y Female with cholelithiasis    pt and the family does not want any surgical intervention at this time  Tentatively was placed in the schedule but taken off from the schedule  Plan was discussed with the pt and the son  Plan also discussed with Dr. Olivo

## 2019-11-06 NOTE — H&P ADULT - PROBLEM SELECTOR PLAN 1
D5NS  Rocephin - positive UA   - chronic stewart cath  - afebrile   - no dysuria , urinary incontinence or flank pain  - Leukocytosis   - Lactate 1.2   - c/w IV fluids   - Will start with Rocephin  - f/u blood cultures (specimen received)   - f/u urine cultures (specimen received)  - f/u PT

## 2019-11-06 NOTE — ED PROVIDER NOTE - OBJECTIVE STATEMENT
Patient is an 84 y/o female with PMHx OA, CAD, DM, HTN, with Rosario catheter in place, presenting with leaking around Rosario catheter and suprapubic pressure. Per health aide, they noticed the leaking around the catheter entry site in the last 4 hours and the urine bag was not filling as much as it sohuld be. Patient also reports earlier in the day she took laxatives because of constipation and felt very weak during her bm, also having hot flashes. Reportedly, these sx have now relieved after the bm. Patient denies nausea, vomiting, or any other acute complaints. Patient is an 86 y/o female with PMHx OA, CAD, DM, HTN, with Rosario catheter in place, presenting with leaking around Rosario catheter and suprapubic pressure. Per health aide, they noticed the leaking around the catheter entry site in the last 4 hours and the urine bag was not filling as much as it should be. Patient also reports earlier in the day she took laxatives/stool softener and prune juice because of constipation and felt very weak during her bm, also having hot flashes. Reportedly, these sx have now relieved after the bm. Patient denies nausea, vomiting, or any other acute complaints.

## 2019-11-06 NOTE — H&P ADULT - HISTORY OF PRESENT ILLNESS
85 year old female from home lives with daughter with private HHA 24hr/7 days bedbound at baseline walks with help with PMHx OA, CAD, DM, HTN, Spinal Stenosis with chronic Rosario catheter in place and PSH of Hysterectomy presenting with leaking around Rosario catheter and suprapubic pressure. Per health aide, they noticed the leaking around the catheter entry site in the last 4 hours and the urine bag was not filling as much as it should be. Patient also reports earlier in the day she took laxatives/stool softener and prune juice because of constipation and felt very weak during her bm, also having hot flashes. Reportedly, these sx have now relieved after the bm.   pt vomited twice during admission  Patient denies nausea or any other acute complaints. pt denies smoking, alcohol, illicit drug use. NKDA     In ED :  Chronic Rosario was replaced  UA +ve   Leukocytosis  EKG : NSR   CT Abdomen : gallstones , Intrahepatic ductal dilatation ,  fecal impaction with stercoral colitis.  US abdomen : Cholelithiasis  LFTs wnl

## 2019-11-06 NOTE — H&P ADULT - PROBLEM SELECTOR PLAN 3
- CT Abdomen showed incidental 1.2 cm exophytic hyperdense lesion arising from the lower pole the left kidney. Additional indeterminate 1.6 cm right renal lesion.  - might need MRI with contrast

## 2019-11-06 NOTE — ED PROVIDER NOTE - PHYSICAL EXAMINATION
GI:  fullness in suprapubic area  :  stewart cath in place  urine bag has small amount of yellow urine

## 2019-11-06 NOTE — H&P ADULT - ASSESSMENT
Hysterectomy   Vomiting twice during admission   bedbound at baseline walks with help   Denies smoking, alcohol, illicit drug use. Lives at home with daughter   private HHA 24hr/7 days       UA +ve   EKG : NSR     CT Abdomen : IMPRESSION:       1. Marked fecal impaction within the rectosigmoid colon with suspected   superimposed stercoral colitis.  2. 1.2 cm exophytic hyperdense lesion arising from the lower pole the   left kidney. Additional indeterminate 1.6 cm right renal lesion. Further   evaluation with contrast-enhanced MR is recommended.  3. Intrahepatic ductal dilatation of uncertain etiology. Further   evaluation with MRI/MRCP is advised.  4. Distended gallbladder with cholelithiasis and wall hyperenhancement.   Recommend correlation with ultrasound.3      US abdmomen :   Cholelithiasis. No wall thickening or pericholecystic fluid. Distention   seen on CT is not appreciated on the current study due to adjacent bowel   gas and contraction with stones. Recommend DISIDA scan if acute   cholecystitis is of clinical concern.    Right renal cyst. 85 year old female from home lives with daughter with private HHA 24hr/7 days bedbound at baseline walks with help with PMHx OA, CAD, DM, HTN, Spinal Stenosis with chronic Rosario catheter in place and PSH of Hysterectomy presenting with leaking around Rosario catheter and suprapubic pressure. Per health aide, they noticed the leaking around the catheter entry site in the last 4 hours and the urine bag was not filling as much as it should be. Patient also reports earlier in the day she took laxatives/stool softener and prune juice because of constipation and felt very weak during her bm, also having hot flashes. Reportedly, these sx have now relieved after the bm.   pt vomited twice during admission  Patient denies nausea or any other acute complaints. pt denies smoking, alcohol, illicit drug use. NKDA     In ED :  Chronic Rosario was replaced  UA +ve   Leukocytosis  EKG : NSR   CT Abdomen : gallstones , Intrahepatic ductal dilatation ,  fecal impaction with stercoral colitis.  US abdomen : Cholelithiasis  LFTs wnl    Pt is admitted for management of Cystitis and possible Cholecystitis

## 2019-11-06 NOTE — ED PROVIDER NOTE - CLINICAL SUMMARY MEDICAL DECISION MAKING FREE TEXT BOX
86 y/o male pt with indwelling cath presents with suprapubic pressure and leaking around cath site. Will change Rosario and reassess. 84 y/o male pt with indwelling cath presents with suprapubic pressure and leaking around cath site. Will change Stewart and reassess.     After stewart changes, no more leaking, however patient reported feeling unwell and then proceeded to vomit x2.  EKG nonischemic, trop neg, wbc 16K, cmp unremarkable, UA cw UTI-given ceftriaxone  CT A/P shows 86 y/o male pt with indwelling cath presents with suprapubic pressure and leaking around cath site. Will change Stewart and reassess.     After stewart changes, no more leaking, however patient reported feeling unwell and then proceeded to vomit x2.  EKG nonischemic, trop neg, wbc 16K, cmp unremarkable, UA cw UTI-given ceftriaxone  CT A/P shows 1. Marked fecal impaction within the rectosigmoid colon with suspected superimposed stercoral colitis. 2. 1.2 cm exophytic hyperdense lesion arising from the lower pole the left kidney. Additional indeterminate 1.6 cm right renal lesion. Further evaluation with contrast-enhanced MR is recommended.3. Intrahepatic ductal dilatation of uncertain etiology. Further evaluation with MRI/MRCP is advised.4. Distended gallbladder with cholelithiasis and wall hyperenhancement.  Recommend correlation with ultrasound.    Discussed above with patient. On reeval patient has mild ttp over RUQ, no rebound/guarding.  @5am Dr. Hui from surgery consulted regarding gallbladder findings. 84 y/o male pt with indwelling cath presents with suprapubic pressure and leaking around cath site. Will change Stewart and reassess.     After stewart changes, no more leaking, however patient reported feeling unwell and then proceeded to vomit x2.  EKG nonischemic, trop neg, wbc 16K, cmp unremarkable, UA cw UTI-given ceftriaxone  CT A/P shows 1. Marked fecal impaction within the rectosigmoid colon with suspected superimposed stercoral colitis. 2. 1.2 cm exophytic hyperdense lesion arising from the lower pole the left kidney. Additional indeterminate 1.6 cm right renal lesion. Further evaluation with contrast-enhanced MR is recommended.3. Intrahepatic ductal dilatation of uncertain etiology. Further evaluation with MRI/MRCP is advised.4. Distended gallbladder with cholelithiasis and wall hyperenhancement.  Recommend correlation with ultrasound.    Discussed above with patient. On reeval patient has mild ttp over RUQ, no rebound/guarding. Patient reports she had BM while in ED  this morning.  @5am Dr. Hui from surgery consulted regarding gallbladder findings.  Surgery evaluated patient reports no surgical intervention recommended at this time, recommend RUQ US for further evaluation.  patient stable for admission to medical service.

## 2019-11-06 NOTE — PROGRESS NOTE ADULT - SUBJECTIVE AND OBJECTIVE BOX
Preop Dx: cholelithiasis  Surgeon: Dr. Prajapati  Procedure: laparoscopic cholecystectomy    Vital Signs Last 24 Hrs  T(C): 37.1 (06 Nov 2019 16:31), Max: 37.1 (06 Nov 2019 16:31)  T(F): 98.7 (06 Nov 2019 16:31), Max: 98.7 (06 Nov 2019 16:31)  HR: 79 (06 Nov 2019 16:31) (58 - 79)  BP: 134/50 (06 Nov 2019 16:31) (126/70 - 151/67)  RR: 18 (06 Nov 2019 16:31) (16 - 18)  SpO2: 100% (06 Nov 2019 16:31) (99% - 100%)                        12.5   18.35 )-----------( 297      ( 06 Nov 2019 12:29 )             38.0     11-06    135  |  101  |  30<H>  ----------------------------<  150<H>  5.3   |  28  |  1.33<H>    Ca    9.0      06 Nov 2019 12:29  Phos  3.6     11-06  Mg     2.3     11-06    TPro  6.8  /  Alb  3.0<L>  /  TBili  0.6  /  DBili  x   /  AST  21  /  ALT  52  /  AlkPhos  93  11-06    PT/INR - ( 06 Nov 2019 03:02 )   PT: 10.7 sec;   INR: 0.97 ratio    PTT - ( 06 Nov 2019 03:02 )  PTT:28.4 sec    Type and Screen: pending draw 11/7 am pt with upper abdominal pain and diarrhea after she was taking laxatives  Abdomen was tender in am but improved    Vital Signs Last 24 Hrs  T(C): 37.1 (06 Nov 2019 16:31), Max: 37.1 (06 Nov 2019 16:31)  T(F): 98.7 (06 Nov 2019 16:31), Max: 98.7 (06 Nov 2019 16:31)  HR: 79 (06 Nov 2019 16:31) (58 - 79)  BP: 134/50 (06 Nov 2019 16:31) (126/70 - 151/67)  RR: 18 (06 Nov 2019 16:31) (16 - 18)  SpO2: 100% (06 Nov 2019 16:31) (99% - 100%)                        12.5   18.35 )-----------( 297      ( 06 Nov 2019 12:29 )             38.0     11-06    135  |  101  |  30<H>  ----------------------------<  150<H>  5.3   |  28  |  1.33<H>    Ca    9.0      06 Nov 2019 12:29  Phos  3.6     11-06  Mg     2.3     11-06    TPro  6.8  /  Alb  3.0<L>  /  TBili  0.6  /  DBili  x   /  AST  21  /  ALT  52  /  AlkPhos  93  11-06    PT/INR - ( 06 Nov 2019 03:02 )   PT: 10.7 sec;   INR: 0.97 ratio    PTT - ( 06 Nov 2019 03:02 )  PTT:28.4 sec    US abdomen finding noted

## 2019-11-06 NOTE — H&P ADULT - PROBLEM SELECTOR PLAN 5
- Patient not on any meds at home  - will start sliding scale  - Monitor blood sugars AC/HS and adjust as needed  - goal -180.   - f/u HgA1c  - Carbohydrate consistent diabetic diet with evening snacks.

## 2019-11-06 NOTE — CONSULT NOTE ADULT - SUBJECTIVE AND OBJECTIVE BOX
HPI: Patient is an 84 y/o female with PMHx OA, CAD, DM, HTN, with Stewart catheter in place, presenting with leaking around Stewart catheter and suprapubic pressure. Per health aide, they noticed the leaking around the catheter entry site in the last 4 hours and the urine bag was not filling as much as it should be. Patient also reports earlier in the day she took laxatives/stool softener and prune juice because of constipation and felt very weak during her bm, also having hot flashes. Reportedly, these sx have now relieved after the bm    CT obtained by ED resulted  < from: CT Abdomen and Pelvis w/ IV Cont (11.06.19 @ 04:33) >  IMPRESSION:       1. Marked fecal impaction within the rectosigmoid colon with suspected   superimposed stercoral colitis.  2. 1.2 cm exophytic hyperdense lesion arising from the lower pole the   left kidney. Additional indeterminate 1.6 cm right renal lesion. Further   evaluation with contrast-enhanced MR is recommended.  3. Intrahepatic ductal dilatation of uncertain etiology. Further   evaluation with MRI/MRCP is advised.  4. Distended gallbladder with cholelithiasis and wall hyperenhancement.   Recommend correlation with ultrasound.      < end of copied text >        PAST MEDICAL & SURGICAL HISTORY:  Stewart catheter in place  CAD (coronary artery disease)  OA (osteoarthritis)  DM (diabetes mellitus)  HTN (hypertension)  Stented coronary artery      Vital Signs Last 24 Hrs  T(C): 36.4 (05 Nov 2019 22:57), Max: 36.4 (05 Nov 2019 22:57)  T(F): 97.6 (05 Nov 2019 22:57), Max: 97.6 (05 Nov 2019 22:57)  HR: 58 (05 Nov 2019 22:57) (58 - 58)  BP: 126/70 (05 Nov 2019 22:57) (126/70 - 126/70)  BP(mean): --  RR: 16 (05 Nov 2019 22:57) (16 - 16)  SpO2: 99% (05 Nov 2019 22:57) (99% - 99%)                          12.5   16.51 )-----------( 351      ( 06 Nov 2019 03:02 )             38.1     11-06    136  |  102  |  30<H>  ----------------------------<  130<H>  4.9   |  26  |  1.18    Ca    9.1      06 Nov 2019 03:02    TPro  7.2  /  Alb  3.1<L>  /  TBili  0.6  /  DBili  x   /  AST  35  /  ALT  61<H>  /  AlkPhos  88  11-06    PT/INR - ( 06 Nov 2019 03:02 )   PT: 10.7 sec;   INR: 0.97 ratio         PTT - ( 06 Nov 2019 03:02 )  PTT:28.4 sec    PHYSICAL EXAM  Pt NAD  alert, oriented  Abdomen: soft, non tender at this time, no rebound or guarding      ASSESSMENT/ PLAN:  CT w/finding of gallstones, no abd tenderness now, LFTs wnl  leukocytosis w/+UA, chronic stewart cath  recommend abx as per med  may obtain RUQ sono to eval GB further  pt expressed no desire for surgical mgmt of gallstones

## 2019-11-06 NOTE — H&P ADULT - NSHPPHYSICALEXAM_GEN_ALL_CORE
Vital Signs Last 24 Hrs  T(C): 37.1 (06 Nov 2019 16:31), Max: 37.1 (06 Nov 2019 16:31)  T(F): 98.7 (06 Nov 2019 16:31), Max: 98.7 (06 Nov 2019 16:31)  HR: 79 (06 Nov 2019 16:31) (58 - 79)  BP: 134/50 (06 Nov 2019 16:31) (126/70 - 151/67)  BP(mean): --  RR: 18 (06 Nov 2019 16:31) (16 - 18)  SpO2: 100% (06 Nov 2019 16:31) (99% - 100%) Vital Signs Last 24 Hrs  T(C): 37.1 (06 Nov 2019 16:31), Max: 37.1 (06 Nov 2019 16:31)  T(F): 98.7 (06 Nov 2019 16:31), Max: 98.7 (06 Nov 2019 16:31)  HR: 79 (06 Nov 2019 16:31) (58 - 79)  BP: 134/50 (06 Nov 2019 16:31) (126/70 - 151/67)  BP(mean): --  RR: 18 (06 Nov 2019 16:31) (16 - 18)  SpO2: 100% (06 Nov 2019 16:31) (99% - 100%)    · Physical Examination:   · CONSTITUTIONAL: Well appearing, well nourished, awake, alert, oriented to person, place, time/situation and in no apparent distress.  · ENMT: Airway patent, Nasal mucosa clear. Mouth with normal mucosa. Throat has no vesicles, no oropharyngeal exudates and uvula is midline.  · EYES: Clear bilaterally, pupils equal, round and reactive to light.  · CARDIAC: Normal rate, regular rhythm.  Heart sounds S1, S2.  No murmurs, rubs or gallops.  · RESPIRATORY: Breath sounds clear and equal bilaterally.  · GASTROINTESTINAL: Abdomen soft, non-tender, no guarding. GI: fullness in suprapubic area with RUQ , LLQ abdominal Pain   . :stewart cath in place , urine bag has small amount of yellow urine  · MUSCULOSKELETAL: Spine appears normal, range of motion is not limited, no muscle or joint tenderness  · NEUROLOGICAL: Alert and oriented, no focal deficits, no motor or sensory deficits.  · SKIN: Skin normal color for race, warm, dry and intact. No evidence of rash.

## 2019-11-06 NOTE — H&P ADULT - PROBLEM SELECTOR PLAN 2
NPO except meds  ocephin and flagyl      Cholelithiasis. No wall thickening or pericholecystic fluid. Distention   seen on CT is not appreciated on the current study due to adjacent bowel   gas and contraction with stones. Recommend DISIDA scan if acute   cholecystitis is of clinical concern.    Right renal cyst.      zofran PRN - p/w with RUQ Abd Pain and vomiting    - Leukocytosis , afebrile , no Nausea , bloating or shivering  - LFTs wnl  - PE : soft mild RUQ tenderness , -ve murphys sign , -ve obturator sign , no guarding or rebound tenderness,   - CT Abdomen : gallstones , Intrahepatic ductal dilatation ,  fecal impaction with stercoral colitis.  - US abdmomen : Cholelithiasis  - DD: Gallstone, acute cholecystitis,   - Full liquid diet now   - will start Abx Rocephin and flagyl IV for now till w/u is complete as stone in gallbladder neck is a nidus for infection  - c/w IVF D5NS @ 70ml/hr (gentle hydration)  - c/w tyelnol Ketorlac , Morphine for Pain control PRN / Zofran for nausea or vomiting  PRN   - f/u daily LFTs   - f/u blood cultures (specimen received) / urine cultures (specimen recieved)  - pt and the family does not want any surgical intervention at this time  ** Surgery consult noted

## 2019-11-06 NOTE — H&P ADULT - PROBLEM SELECTOR PLAN 6
IMPROVE VTE Individual Risk Assessment    RISK                                                                Points    [  ] Previous VTE                                                  3    [  ] Thrombophilia                                               2    [  ] Lower limb paralysis                                      2        (unable to hold up >15 seconds)    [  ] Current Cancer                                              2         (within 6 months)  [X] Immobilization > 24 hrs                                1  [  ] ICU/CCU stay > 24 hours                              1  [X] Age > 60                                                      1    IMPROVE VTE Score _____2____  c/w Lovenox 40 mg qd - c/w prednisone

## 2019-11-07 ENCOUNTER — TRANSCRIPTION ENCOUNTER (OUTPATIENT)
Age: 84
End: 2019-11-07

## 2019-11-07 DIAGNOSIS — K52.9 NONINFECTIVE GASTROENTERITIS AND COLITIS, UNSPECIFIED: ICD-10-CM

## 2019-11-07 LAB
24R-OH-CALCIDIOL SERPL-MCNC: 67.5 NG/ML — SIGNIFICANT CHANGE UP (ref 30–80)
ALBUMIN SERPL ELPH-MCNC: 2.6 G/DL — LOW (ref 3.5–5)
ALP SERPL-CCNC: 80 U/L — SIGNIFICANT CHANGE UP (ref 40–120)
ALT FLD-CCNC: 40 U/L DA — SIGNIFICANT CHANGE UP (ref 10–60)
ANION GAP SERPL CALC-SCNC: 7 MMOL/L — SIGNIFICANT CHANGE UP (ref 5–17)
AST SERPL-CCNC: 21 U/L — SIGNIFICANT CHANGE UP (ref 10–40)
BASOPHILS # BLD AUTO: 0.04 K/UL — SIGNIFICANT CHANGE UP (ref 0–0.2)
BASOPHILS NFR BLD AUTO: 0.3 % — SIGNIFICANT CHANGE UP (ref 0–2)
BILIRUB SERPL-MCNC: 0.5 MG/DL — SIGNIFICANT CHANGE UP (ref 0.2–1.2)
BUN SERPL-MCNC: 33 MG/DL — HIGH (ref 7–18)
CALCIUM SERPL-MCNC: 8.8 MG/DL — SIGNIFICANT CHANGE UP (ref 8.4–10.5)
CHLORIDE SERPL-SCNC: 104 MMOL/L — SIGNIFICANT CHANGE UP (ref 96–108)
CHOLEST SERPL-MCNC: 156 MG/DL — SIGNIFICANT CHANGE UP (ref 10–199)
CO2 SERPL-SCNC: 27 MMOL/L — SIGNIFICANT CHANGE UP (ref 22–31)
CREAT SERPL-MCNC: 1.51 MG/DL — HIGH (ref 0.5–1.3)
CULTURE RESULTS: SIGNIFICANT CHANGE UP
EOSINOPHIL # BLD AUTO: 0.22 K/UL — SIGNIFICANT CHANGE UP (ref 0–0.5)
EOSINOPHIL NFR BLD AUTO: 1.6 % — SIGNIFICANT CHANGE UP (ref 0–6)
FOLATE SERPL-MCNC: >20 NG/ML — SIGNIFICANT CHANGE UP
GLUCOSE BLDC GLUCOMTR-MCNC: 127 MG/DL — HIGH (ref 70–99)
GLUCOSE BLDC GLUCOMTR-MCNC: 133 MG/DL — HIGH (ref 70–99)
GLUCOSE BLDC GLUCOMTR-MCNC: 144 MG/DL — HIGH (ref 70–99)
GLUCOSE BLDC GLUCOMTR-MCNC: 213 MG/DL — HIGH (ref 70–99)
GLUCOSE SERPL-MCNC: 113 MG/DL — HIGH (ref 70–99)
HBA1C BLD-MCNC: 6.4 % — HIGH (ref 4–5.6)
HCT VFR BLD CALC: 34.3 % — LOW (ref 34.5–45)
HDLC SERPL-MCNC: 62 MG/DL — SIGNIFICANT CHANGE UP
HGB BLD-MCNC: 11 G/DL — LOW (ref 11.5–15.5)
IMM GRANULOCYTES NFR BLD AUTO: 0.5 % — SIGNIFICANT CHANGE UP (ref 0–1.5)
LIPID PNL WITH DIRECT LDL SERPL: 82 MG/DL — SIGNIFICANT CHANGE UP
LYMPHOCYTES # BLD AUTO: 15.1 % — SIGNIFICANT CHANGE UP (ref 13–44)
LYMPHOCYTES # BLD AUTO: 2.09 K/UL — SIGNIFICANT CHANGE UP (ref 1–3.3)
MAGNESIUM SERPL-MCNC: 2.4 MG/DL — SIGNIFICANT CHANGE UP (ref 1.6–2.6)
MCHC RBC-ENTMCNC: 29.5 PG — SIGNIFICANT CHANGE UP (ref 27–34)
MCHC RBC-ENTMCNC: 32.1 GM/DL — SIGNIFICANT CHANGE UP (ref 32–36)
MCV RBC AUTO: 92 FL — SIGNIFICANT CHANGE UP (ref 80–100)
MONOCYTES # BLD AUTO: 0.85 K/UL — SIGNIFICANT CHANGE UP (ref 0–0.9)
MONOCYTES NFR BLD AUTO: 6.1 % — SIGNIFICANT CHANGE UP (ref 2–14)
NEUTROPHILS # BLD AUTO: 10.58 K/UL — HIGH (ref 1.8–7.4)
NEUTROPHILS NFR BLD AUTO: 76.4 % — SIGNIFICANT CHANGE UP (ref 43–77)
NRBC # BLD: 0 /100 WBCS — SIGNIFICANT CHANGE UP (ref 0–0)
PHOSPHATE SERPL-MCNC: 3.2 MG/DL — SIGNIFICANT CHANGE UP (ref 2.5–4.5)
PLATELET # BLD AUTO: 271 K/UL — SIGNIFICANT CHANGE UP (ref 150–400)
POTASSIUM SERPL-MCNC: 4.7 MMOL/L — SIGNIFICANT CHANGE UP (ref 3.5–5.3)
POTASSIUM SERPL-SCNC: 4.7 MMOL/L — SIGNIFICANT CHANGE UP (ref 3.5–5.3)
PROT SERPL-MCNC: 6 G/DL — SIGNIFICANT CHANGE UP (ref 6–8.3)
RBC # BLD: 3.73 M/UL — LOW (ref 3.8–5.2)
RBC # FLD: 15.1 % — HIGH (ref 10.3–14.5)
SODIUM SERPL-SCNC: 138 MMOL/L — SIGNIFICANT CHANGE UP (ref 135–145)
SPECIMEN SOURCE: SIGNIFICANT CHANGE UP
TOTAL CHOLESTEROL/HDL RATIO MEASUREMENT: 2.5 RATIO — LOW (ref 3.3–7.1)
TRIGL SERPL-MCNC: 60 MG/DL — SIGNIFICANT CHANGE UP (ref 10–149)
TSH SERPL-MCNC: 0.73 UU/ML — SIGNIFICANT CHANGE UP (ref 0.34–4.82)
VIT B12 SERPL-MCNC: 333 PG/ML — SIGNIFICANT CHANGE UP (ref 232–1245)
WBC # BLD: 13.85 K/UL — HIGH (ref 3.8–10.5)
WBC # FLD AUTO: 13.85 K/UL — HIGH (ref 3.8–10.5)

## 2019-11-07 PROCEDURE — 99232 SBSQ HOSP IP/OBS MODERATE 35: CPT

## 2019-11-07 PROCEDURE — 99233 SBSQ HOSP IP/OBS HIGH 50: CPT | Mod: GC

## 2019-11-07 RX ORDER — SODIUM CHLORIDE 9 MG/ML
1000 INJECTION INTRAMUSCULAR; INTRAVENOUS; SUBCUTANEOUS
Refills: 0 | Status: DISCONTINUED | OUTPATIENT
Start: 2019-11-07 | End: 2019-11-09

## 2019-11-07 RX ORDER — ACETAMINOPHEN 500 MG
2 TABLET ORAL
Qty: 0 | Refills: 0 | DISCHARGE
Start: 2019-11-07

## 2019-11-07 RX ADMIN — Medication 15 MILLIGRAM(S): at 16:10

## 2019-11-07 RX ADMIN — TRAMADOL HYDROCHLORIDE 100 MILLIGRAM(S): 50 TABLET ORAL at 22:04

## 2019-11-07 RX ADMIN — Medication 500 MILLIGRAM(S): at 21:55

## 2019-11-07 RX ADMIN — Medication 500 MILLIGRAM(S): at 13:21

## 2019-11-07 RX ADMIN — Medication 25 MILLIGRAM(S): at 05:55

## 2019-11-07 RX ADMIN — GABAPENTIN 200 MILLIGRAM(S): 400 CAPSULE ORAL at 21:55

## 2019-11-07 RX ADMIN — Medication 100 MILLIGRAM(S): at 21:56

## 2019-11-07 RX ADMIN — LISINOPRIL 10 MILLIGRAM(S): 2.5 TABLET ORAL at 05:54

## 2019-11-07 RX ADMIN — Medication 100 MILLIGRAM(S): at 13:21

## 2019-11-07 RX ADMIN — ENOXAPARIN SODIUM 40 MILLIGRAM(S): 100 INJECTION SUBCUTANEOUS at 11:34

## 2019-11-07 RX ADMIN — SODIUM CHLORIDE 60 MILLILITER(S): 9 INJECTION INTRAMUSCULAR; INTRAVENOUS; SUBCUTANEOUS at 11:41

## 2019-11-07 RX ADMIN — CHLORHEXIDINE GLUCONATE 1 APPLICATION(S): 213 SOLUTION TOPICAL at 05:54

## 2019-11-07 RX ADMIN — CEFTRIAXONE 100 MILLIGRAM(S): 500 INJECTION, POWDER, FOR SOLUTION INTRAMUSCULAR; INTRAVENOUS at 11:33

## 2019-11-07 RX ADMIN — Medication 500 MILLIGRAM(S): at 05:54

## 2019-11-07 RX ADMIN — Medication 15 MILLIGRAM(S): at 17:00

## 2019-11-07 RX ADMIN — Medication 6 MILLIGRAM(S): at 05:54

## 2019-11-07 RX ADMIN — TRAMADOL HYDROCHLORIDE 100 MILLIGRAM(S): 50 TABLET ORAL at 21:54

## 2019-11-07 RX ADMIN — Medication 2: at 12:25

## 2019-11-07 RX ADMIN — Medication 81 MILLIGRAM(S): at 11:34

## 2019-11-07 RX ADMIN — Medication 100 MILLIGRAM(S): at 05:55

## 2019-11-07 NOTE — PROGRESS NOTE ADULT - ATTENDING COMMENTS
Patient seen and examined with PGY2 MAR and MS4; Agree with PGY1 A/P above with editing as needed. My independent assessment, findings on exam, diagnosis and plan of care as listed below    Patient admitted with abdominal pain and vomiting. patient initially brought to ED for leaking stewart catheter. She has a chronic stewart which was removed.  She is followed by House calls MD. She also has long standing Constipation and diarrhea after laxatives. She was found to have fecal impaction and Stercoral colitis on CT. Patient does have some upper abdominal pain on right side as well as some suprapubic pain. As per patient vomited twice in ED.     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; Non smoker; No alcohol; Non contributory FH to current presentation    Vital Signs Last 24 Hrs  T(C): 36.8 (07 Nov 2019 12:59), Max: 37.8 (06 Nov 2019 18:10)  T(F): 98.3 (07 Nov 2019 12:59), Max: 100 (06 Nov 2019 18:10)  HR: 80 (07 Nov 2019 12:59) (76 - 98)  BP: 101/53 (07 Nov 2019 12:59) (86/50 - 143/100)  RR: 18 (07 Nov 2019 12:59) (18 - 18)  SpO2: 99% (07 Nov 2019 12:59) (95% - 100%)    P/E:  Neuro: AAO x3; No gross focal neurological defiicts  CVS: S1S2 present  Resp: BLAE+, No wheeze otr Rhonchi  GI: Soft, BS+, Tenderness RUQ on deep plapation  Extr; No edema or calf tenderness B/L LE    Labs:                        11.0   13.85 )-----------( 271      ( 07 Nov 2019 07:26 )             34.3   11-07    138  |  104  |  33<H>  ----------------------------<  113<H>  4.7   |  27  |  1.51<H>    Ca    8.8      07 Nov 2019 07:26  Phos  3.2     11-07  Mg     2.4     11-07    TPro  6.0  /  Alb  2.6<L>  /  TBili  0.5  /  DBili  x   /  AST  21  /  ALT  40  /  AlkPhos  80  11-07        D/D;  RUQ pain with Leucocytosis and Cholelithiasis concern for Cholecystitis  Possible Leucocytosis from UTI given chronic indwelling Stewart catheter  Abdominal Pain also possible from Fecal impaction  Type 2 DM  HTN    Plan:  Patient is clinically improved; Advance diet to soft diet and monitor  Sx eval and follow up appreciated; d/w Dr. Prajapati; No Sx intervention at this time  Continue Ceftriaxone and Flagyl for potential subclinical Cholecystitis and UTI coverage   Follow up Blood and Urine Cx   Will need Stool softeners routine and laxatives as needed on discharge  Repeat Urine Cx; Intial one contaminated    Discussed with patient findings and plan of care  Discussed jacobo PGY3 DR. Haji and RN    FULL NOTE TO FOLLOW Patient seen and examined with PGY2 MAR and MS4; Agree with PGY1 A/P above with editing as needed. My independent assessment, findings on exam, diagnosis and plan of care as listed below    Patient admitted with abdominal pain and vomiting. patient initially brought to ED for leaking stewart catheter. She has a chronic stewart which was replaced in ED.  She is followed by House calls MD. She also has long standing Constipation develops diarrhea after laxatives. She was found to have fecal impaction and Stercoral colitis on CT. Patient had upper abdominal pain on right side as well as some suprapubic pain on admission and had vomited twice in ED.     Patient is clinically doing much better. She feels good. OOB to chair. No abdominal pain. Tolerated full liquid diet. No fever. No new complaints.    Vital Signs Last 24 Hrs  T(C): 36.8 (07 Nov 2019 12:59), Max: 37.8 (06 Nov 2019 18:10)  T(F): 98.3 (07 Nov 2019 12:59), Max: 100 (06 Nov 2019 18:10)  HR: 80 (07 Nov 2019 12:59) (76 - 98)  BP: 101/53 (07 Nov 2019 12:59) (86/50 - 143/100)  RR: 18 (07 Nov 2019 12:59) (18 - 18)  SpO2: 99% (07 Nov 2019 12:59) (95% - 100%)    P/E:  Neuro: AAO x3; No gross focal neurological deficits  CVS: S1S2 present  Resp: BLAE+, No wheeze or Rhonchi  GI: Soft, BS+, Tenderness RUQ on deep palpation  Extr; No edema or calf tenderness B/L LE    Labs:                        11.0   13.85 )-----------( 271      ( 07 Nov 2019 07:26 )             34.3   11-07    138  |  104  |  33<H>  ----------------------------<  113<H>  4.7   |  27  |  1.51<H>    Ca    8.8      07 Nov 2019 07:26  Phos  3.2     11-07  Mg     2.4     11-07    TPro  6.0  /  Alb  2.6<L>  /  TBili  0.5  /  DBili  x   /  AST  21  /  ALT  40  /  AlkPhos  80  11-07        D/D;  RUQ pain with Leucocytosis and Cholelithiasis concern for Cholecystitis resolved pain  Possible Leucocytosis from UTI given chronic indwelling Stewart catheter resolving well  Abdominal Pain also possible from Fecal impaction  Type 2 DM  HTN    Plan:  Patient is clinically much improved; Advance diet to soft diet and monitor  Sx eval and follow up appreciated; d/w Dr. Prajapati; No Sx intervention at this time; F/U outpatient if recurrent RUQ pain.  Continue Ceftriaxone and Flagyl for potential subclinical Cholecystitis and UTI coverage   Follow up Blood and Urine Cx   Will need Stool softeners routine and laxatives as needed on discharge  Repeat Urine Cx; Initial one contaminated; d/w RN  PT evaluation    Discussed with patient findings and plan of care  Discussed with PGY3 DR. Haji and RN    D/C Plan in the next 24 to 48 hrs Patient seen and examined with PGY2 MAR and MS4; Agree with PGY1 A/P above with editing as needed. My independent assessment, findings on exam, diagnosis and plan of care as listed below    Patient admitted with abdominal pain and vomiting. patient initially brought to ED for leaking stewart catheter. She has a chronic stewart which was replaced in ED.  She is followed by House calls MD. She also has long standing Constipation develops diarrhea after laxatives. She was found to have fecal impaction and Stercoral colitis on CT. Patient had upper abdominal pain on right side as well as some suprapubic pain on admission and had vomited twice in ED.     Patient is clinically doing much better. She feels good. OOB to chair. No abdominal pain. Tolerated full liquid diet. No fever. No new complaints.    Vital Signs Last 24 Hrs  T(C): 36.8 (07 Nov 2019 12:59), Max: 37.8 (06 Nov 2019 18:10)  T(F): 98.3 (07 Nov 2019 12:59), Max: 100 (06 Nov 2019 18:10)  HR: 80 (07 Nov 2019 12:59) (76 - 98)  BP: 101/53 (07 Nov 2019 12:59) (86/50 - 143/100)  RR: 18 (07 Nov 2019 12:59) (18 - 18)  SpO2: 99% (07 Nov 2019 12:59) (95% - 100%)    P/E:  Neuro: AAO x3; No gross focal neurological deficits  CVS: S1S2 present  Resp: BLAE+, No wheeze or Rhonchi  GI: Soft, BS+, Tenderness RUQ on deep palpation  Extr; No edema or calf tenderness B/L LE    Labs:                        11.0   13.85 )-----------( 271      ( 07 Nov 2019 07:26 )             34.3   11-07    138  |  104  |  33<H>  ----------------------------<  113<H>  4.7   |  27  |  1.51<H>    Ca    8.8      07 Nov 2019 07:26  Phos  3.2     11-07  Mg     2.4     11-07    TPro  6.0  /  Alb  2.6<L>  /  TBili  0.5  /  DBili  x   /  AST  21  /  ALT  40  /  AlkPhos  80  11-07        D/D;  RUQ pain with Leucocytosis and Cholelithiasis concern for Cholecystitis resolved pain  Possible Leucocytosis from UTI given chronic indwelling Stewart catheter resolving well  Abdominal Pain also possible from Fecal impaction  Renal cyst   Type 2 DM  HTN    Plan:  Patient is clinically much improved; Advance diet to soft diet and monitor  Sx eval and follow up appreciated; d/w Dr. Prajapati; No Sx intervention at this time; F/U outpatient if recurrent RUQ pain.  Continue Ceftriaxone and Flagyl for potential subclinical Cholecystitis and UTI coverage   Follow up Blood and Urine Cx   Will need Stool softeners routine and laxatives as needed on discharge  Repeat Urine Cx; Initial one contaminated; d/w RN  PT evaluation noted; no skilled needs as per PT at baseline.   Given patient limited functional status at baseline and family would want conservative approach will hold off further wotk up of the renal cyst seen on CT. May consider outpatient Urology folow up    Discussed with patient findings and plan of care  Discussed with PGY3 DR. Haji and RN    D/C Plan in the next 24 to 48 hrs with resumption of 24 hr [employer]. Pvt. hire.

## 2019-11-07 NOTE — PROGRESS NOTE ADULT - PROBLEM SELECTOR PLAN 1
-Chronic constipation with episode of loose stools since yesterday likely 2/2 excessive laxative use  -CT Abd/Pelvis: "Marked fecal impaction within the rectosigmoid colon with suspected superimposed stercoral colitis."  -Started IV rocephin and flagyl ( also for concern of cholecystitis but US liver negative)  -Patient had 3 loose BM since last night.   -Hold laxative for now  -Advanced diet to soft

## 2019-11-07 NOTE — PROGRESS NOTE ADULT - PROBLEM SELECTOR PLAN 2
CT abdomen and Ultrasound abdomen shows Distended gallbladder CT abdomen and Ultrasound abdomen shows Distended gallbladder.   Rios sign negative for acute choleycystitis. Patient is not complaining of abdominal pain  Will monitor for now  c/w rocephin and flagyl

## 2019-11-07 NOTE — DISCHARGE NOTE PROVIDER - NSDCCPCAREPLAN_GEN_ALL_CORE_FT
PRINCIPAL DISCHARGE DIAGNOSIS  Diagnosis: Acute cystitis without hematuria  Assessment and Plan of Treatment:       SECONDARY DISCHARGE DIAGNOSES  Diagnosis: Cholelithiasis  Assessment and Plan of Treatment:     Diagnosis: Colitis  Assessment and Plan of Treatment: Colitis    Diagnosis: DM (diabetes mellitus)  Assessment and Plan of Treatment: DM (diabetes mellitus)    Diagnosis: OA (osteoarthritis)  Assessment and Plan of Treatment: OA (osteoarthritis)    Diagnosis: Renal cyst  Assessment and Plan of Treatment: Renal cyst PRINCIPAL DISCHARGE DIAGNOSIS  Diagnosis: Fecal impaction of colon  Assessment and Plan of Treatment:       SECONDARY DISCHARGE DIAGNOSES  Diagnosis: Acute UTI  Assessment and Plan of Treatment:     Diagnosis: DM (diabetes mellitus)  Assessment and Plan of Treatment: DM (diabetes mellitus)    Diagnosis: OA (osteoarthritis)  Assessment and Plan of Treatment: OA (osteoarthritis)    Diagnosis: Renal cyst  Assessment and Plan of Treatment: Renal cyst    Diagnosis: Colitis  Assessment and Plan of Treatment: Colitis    Diagnosis: Cholelithiasis  Assessment and Plan of Treatment: PRINCIPAL DISCHARGE DIAGNOSIS  Diagnosis: Fecal impaction of colon  Assessment and Plan of Treatment: You presented to ED with abdominal pain and leaking of the indwelling stewart catehter which was changed in ED. You was found to have significant stool impaction as well as stercoral colitis on CT scan. You was treated with Laxatives and stool softeners with resolution of abdominal pain with adequate bowel movements. Continue Senna routinely at bedtime and take Miralax every day or every other day to have at least one bowel movement daily.      SECONDARY DISCHARGE DIAGNOSES  Diagnosis: Acute UTI  Assessment and Plan of Treatment: You also was found to have abnormal urinalysis suggesting possible urinary tract infection secondary to chronic indwelling stewart cathete. Complete antibiotic treatment with Ceftin as prescribed which will also cover suspected UTI.    Diagnosis: Cholelithiasis with chronic cholecystitis  Assessment and Plan of Treatment: You also had an elevated WBC with Right sided abdominal pain and vomiting on admission and CT scan showed stones in gallbaldder as well as possible thickening of gallbladder wall. You was placed on antibiotics Ceftriaxone and Flagyl and Surgery was consulted. You clinically improved dramatically. You as well as your family is not in favor of any surgical intervention unless it is absolutely necessary. Your diet was advanced to clears and then regular which you totlerated well without any new complaints. We will complete antibiotic course for 7 days for suspected acute cholecystitis. Please follow up with Surgery Dr. leung if any future complaints of right upper abdominal pain associated with any vomiting. with v    Diagnosis: DM (diabetes mellitus)  Assessment and Plan of Treatment: DM (diabetes mellitus)    Diagnosis: OA (osteoarthritis)  Assessment and Plan of Treatment: OA (osteoarthritis)    Diagnosis: Renal cyst  Assessment and Plan of Treatment: You was found to have a cyst in your kidney. Given your advanced age and functional debility will not proceed with any aggressive work up at this time which was discussed with your daugter who agrees with the plan. Monitor for now.    Diagnosis: Colitis  Assessment and Plan of Treatment: Colitis    Diagnosis: Cholelithiasis  Assessment and Plan of Treatment: PRINCIPAL DISCHARGE DIAGNOSIS  Diagnosis: Fecal impaction of colon  Assessment and Plan of Treatment: You presented to ED with abdominal pain and leaking of the indwelling stewart catehter which was changed in ED. You was found to have significant stool impaction as well as stercoral colitis on CT scan. You was treated with Laxatives and stool softeners with resolution of abdominal pain with adequate bowel movements. Continue Senna routinely at bedtime and take Miralax every day or every other day to have at least one bowel movement daily.      SECONDARY DISCHARGE DIAGNOSES  Diagnosis: Acute UTI  Assessment and Plan of Treatment: You also was found to have abnormal urinalysis suggesting possible urinary tract infection secondary to chronic indwelling stewart cathete. Complete antibiotic treatment with Ceftin as prescribed which will also cover suspected UTI.    Diagnosis: Cholelithiasis with chronic cholecystitis  Assessment and Plan of Treatment: You also had an elevated WBC with Right sided abdominal pain and vomiting on admission and CT scan showed stones in gallbaldder as well as possible thickening of gallbladder wall. You was placed on antibiotics Ceftriaxone and Flagyl and Surgery was consulted. You clinically improved dramatically. You as well as your family is not in favor of any surgical intervention unless it is absolutely necessary. Your diet was advanced to clears and then regular which you totlerated well without any new complaints. We will complete antibiotic course for 7 days for suspected acute cholecystitis. Please follow up with Surgery Dr. leung if any future complaints of right upper abdominal pain associated with any vomiting. with v    Diagnosis: Rheumatoid arthritis  Assessment and Plan of Treatment: Please continue with Prednisone 4 mg daily home dose. Consider titrating down further very slowly over weeks. If severe pain, may take Tramadol as before.    Diagnosis: Renal cyst  Assessment and Plan of Treatment: You was found to have a cyst in your kidney. Given your advanced age and functional debility will not proceed with any aggressive work up at this time which was discussed with your daugter who agrees with the plan. Monitor for now.    Diagnosis: DM (diabetes mellitus)  Assessment and Plan of Treatment: DM (diabetes mellitus)    Diagnosis: OA (osteoarthritis)  Assessment and Plan of Treatment: OA (osteoarthritis)    Diagnosis: Colitis  Assessment and Plan of Treatment: Colitis    Diagnosis: Cholelithiasis  Assessment and Plan of Treatment:

## 2019-11-07 NOTE — PHYSICAL THERAPY INITIAL EVALUATION ADULT - DIAGNOSIS, PT EVAL
Pt is currently functioning at her baseline level of function, she has deficits in strength and balance affecting her ability to perform ambulation and transfers

## 2019-11-07 NOTE — PROGRESS NOTE ADULT - ASSESSMENT
85 year old female from home lives with daughter with private HHA 24hr/7 days bedbound at baseline walks with help with PMHx OA, CAD, DM, HTN, Spinal Stenosis with chronic Rosario catheter in place and PSH of Hysterectomy presenting with leaking around Rosario catheter and suprapubic pressure. Per health aide, they noticed the leaking around the catheter entry site in the last 4 hours and the urine bag was not filling as much as it should be. Patient also reports earlier in the day she took laxatives/stool softener and prune juice because of constipation and felt very weak during her bm, also having hot flashes. Reportedly, these sx have now relieved after the bm.   pt vomited twice during admission  Patient denies nausea or any other acute complaints. pt denies smoking, alcohol, illicit drug use. NKDA     In ED :  Chronic Rosario was replaced  UA +ve   Leukocytosis  EKG : NSR   CT Abdomen : gallstones , Intrahepatic ductal dilatation ,  fecal impaction with stercoral colitis.  US abdomen : Cholelithiasis  LFTs wnl    Pt is admitted for management of Cystitis and possible Cholecystitis 85 year old female from home lives with daughter with private HHA 24hr/7 days bedbound at baseline walks with help with PMHx OA, CAD, DM, HTN, Spinal Stenosis with chronic Stewart catheter in place and PSH of Hysterectomy presenting with leaking around Stewart catheter and suprapubic pressure since 1 day. Patient is admitted for stercoral colitis and replacement of stewart catheter and was suspicion of acute cholecystitis

## 2019-11-07 NOTE — PHYSICAL THERAPY INITIAL EVALUATION ADULT - DISCHARGE DISPOSITION, PT EVAL
home with daughter and 24/7 HHA, no skilled PT needs upon discharge, pt functioning at baseline/home/no skilled PT needs

## 2019-11-07 NOTE — PROGRESS NOTE ADULT - ASSESSMENT
85 year old female from home lives with daughter with private HHA 24hr/7 days bedbound at baseline walks with help with PMHx OA, CAD, DM, HTN, Spinal Stenosis with chronic Stewart catheter in place and PSH of Hysterectomy presenting with leaking around Stewart catheter and suprapubic pressure since 1 day. Patient is admitted for stercoral colitis and replacement of stewart catheter and was suspicion of acute cholecystitis.

## 2019-11-07 NOTE — PROGRESS NOTE ADULT - PROBLEM SELECTOR PLAN 1
p/w chronic constipation with episode of loose stools since yesterday likely due to excessive laxative use  CT abdomen shows impacted stool and stercoral colitis  Started IV rocephin and flagyl ( also for concern of cholecystitis but Ultrasound liver negative)  Patient had 3 BM since last night.   Will hold laxative for now p/w chronic constipation with episode of loose stools since yesterday likely due to excessive laxative use  CT abdomen shows impacted stool and stercoral colitis  Started IV rocephin and flagyl ( also for concern of cholecystitis but Ultrasound liver negative)  Patient had 3 BM since last night.   Will hold laxative for now  Will advanced diet to soft

## 2019-11-07 NOTE — CHART NOTE - NSCHARTNOTEFT_GEN_A_CORE
pt seen in am  No abdominal pain    ICU Vital Signs Last 24 Hrs  T(C): 36.8 (07 Nov 2019 12:59), Max: 37.8 (06 Nov 2019 18:10)  T(F): 98.3 (07 Nov 2019 12:59), Max: 100 (06 Nov 2019 18:10)  HR: 80 (07 Nov 2019 12:59) (76 - 98)  BP: 101/53 (07 Nov 2019 12:59) (86/50 - 143/100)  BP(mean): --  ABP: --  ABP(mean): --  RR: 18 (07 Nov 2019 12:59) (18 - 18)  SpO2: 99% (07 Nov 2019 12:59) (95% - 100%)                            11.0   13.85 )-----------( 271      ( 07 Nov 2019 07:26 )             34.3     11-07    138  |  104  |  33<H>  ----------------------------<  113<H>  4.7   |  27  |  1.51<H>    Ca    8.8      07 Nov 2019 07:26  Phos  3.2     11-07  Mg     2.4     11-07    TPro  6.0  /  Alb  2.6<L>  /  TBili  0.5  /  DBili  x   /  AST  21  /  ALT  40  /  AlkPhos  80  11-07    abdomen soft, non tender  On clears  Advance diet  F/u in the office if she gets any abdominal pain

## 2019-11-07 NOTE — PROGRESS NOTE ADULT - PROBLEM SELECTOR PLAN 3
-Complicated UTI 2/2 chronic stewart catheter.  -Urine cx shows potential contamination, f/u repeat Urine cx  -Stewart catheter changed in ED  -C/w rocephin and flagyl

## 2019-11-07 NOTE — PROGRESS NOTE ADULT - SUBJECTIVE AND OBJECTIVE BOX
4th Year Medical Student Note discussed with supervising resident and primary attending    Patient is a 85y old  Female who presents with a chief complaint of Cystitis (2019 14:00)      INTERVAL HPI/OVERNIGHT EVENTS:  Pt seen and evaluated at bedside. Sitting comfortably in bed in no acute distress. No overnight events and is A&Ox3 today. She has had three loose bowel movements since yesterday. Continues to have severe pain in her knees 2/2 to OA. Her Rosario catheter was initially placed about a year ago due to recurrent UTIs. She had a previous episode of stool impaction released by suppository about 5 years ago. Denies headache, chest pain, palpitations, shortness of breath, fevers, chills, nausea or vomiting, or lightheadedness.    MEDICATIONS  (STANDING):  ascorbic acid 500 milliGRAM(s) Oral three times a day  aspirin  chewable 81 milliGRAM(s) Oral daily  cefTRIAXone   IVPB 1000 milliGRAM(s) IV Intermittent every 24 hours  cefTRIAXone   IVPB      dextrose 5% + sodium chloride 0.9%. 1000 milliLiter(s) (60 mL/Hr) IV Continuous <Continuous>  enoxaparin Injectable 40 milliGRAM(s) SubCutaneous daily  gabapentin 200 milliGRAM(s) Oral at bedtime  insulin lispro (HumaLOG) corrective regimen sliding scale   SubCutaneous Before meals and at bedtime  lisinopril 10 milliGRAM(s) Oral daily  metoprolol succinate ER 25 milliGRAM(s) Oral daily  metroNIDAZOLE  IVPB      metroNIDAZOLE  IVPB 500 milliGRAM(s) IV Intermittent every 8 hours  polyethylene glycol 3350 17 Gram(s) Oral once  predniSONE   Tablet 6 milliGRAM(s) Oral daily  sodium chloride 0.9%. 1000 milliLiter(s) (60 mL/Hr) IV Continuous <Continuous>  traMADol 100 milliGRAM(s) Oral at bedtime    MEDICATIONS  (PRN):  acetaminophen   Tablet .. 650 milliGRAM(s) Oral every 6 hours PRN Temp greater or equal to 38C (100.4F), Mild Pain (1 - 3)  ketorolac   Injectable 15 milliGRAM(s) IV Push every 6 hours PRN Moderate Pain (4 - 6)  ondansetron Injectable 4 milliGRAM(s) IV Push every 8 hours PRN Nausea and/or Vomiting      __________________________________________________  REVIEW OF SYSTEMS:    CONSTITUTIONAL: No fever,   EYES: no acute visual disturbances  NECK: No pain or stiffness  RESPIRATORY: No cough; No shortness of breath  CARDIOVASCULAR: No chest pain, no palpitations  GASTROINTESTINAL: +Loose bowel movements. No pain. No nausea or vomiting;  NEUROLOGICAL: No headache or numbness, no tremors  MUSCULOSKELETAL: +B/L knee pain  GENITOURINARY: No frequency, no dysuria   PSYCHIATRY: no depression , no anxiety  ALL OTHER  ROS negative        Vital Signs Last 24 Hrs  T(C): 36.8 (2019 12:59), Max: 37.8 (2019 18:10)  T(F): 98.3 (2019 12:59), Max: 100 (2019 18:10)  HR: 80 (2019 12:59) (76 - 98)  BP: 101/53 (2019 12:59) (86/50 - 143/100)  BP(mean): --  RR: 18 (2019 12:59) (18 - 18)  SpO2: 99% (2019 12:59) (95% - 100%)    ________________________________________________  PHYSICAL EXAM:  GENERAL: NAD  HEENT: Normocephalic;  conjunctivae and sclerae clear; moist mucous membranes;   NECK : supple  CHEST/LUNG: Clear to auscultation bilaterally with good air entry   HEART: S1 S2  regular; no murmurs, gallops or rubs  ABDOMEN: Soft, Nontender, Nondistended; Bowel sounds present  EXTREMITIES: +B/L knee swelling. +B/L knees severly tender to palpation  SKIN: warm and dry; no rash  NERVOUS SYSTEM:  Awake and alert; Oriented  to place, person and time ; no new deficits    _________________________________________________  LABS:                        11.0   13.85 )-----------( 271      ( 2019 07:26 )             34.3         138  |  104  |  33<H>  ----------------------------<  113<H>  4.7   |  27  |  1.51<H>    Ca    8.8      2019 07:26  Phos  3.2       Mg     2.4         TPro  6.0  /  Alb  2.6<L>  /  TBili  0.5  /  DBili  x   /  AST  21  /  ALT  40  /  AlkPhos  80      PT/INR - ( 2019 03:02 )   PT: 10.7 sec;   INR: 0.97 ratio         PTT - ( 2019 03:02 )  PTT:28.4 sec  Urinalysis Basic - ( 2019 02:24 )    Color: Yellow / Appearance: Clear / S.005 / pH: x  Gluc: x / Ketone: Negative  / Bili: Negative / Urobili: Negative   Blood: x / Protein: 15 / Nitrite: Negative   Leuk Esterase: Moderate / RBC: 2-5 /HPF / WBC 11-25 /HPF   Sq Epi: x / Non Sq Epi: Few /HPF / Bacteria: Moderate /HPF      CAPILLARY BLOOD GLUCOSE      POCT Blood Glucose.: 213 mg/dL (2019 12:03)  POCT Blood Glucose.: 127 mg/dL (2019 08:09)  POCT Blood Glucose.: 145 mg/dL (2019 21:23)  POCT Blood Glucose.: 131 mg/dL (2019 18:02)        RADIOLOGY & ADDITIONAL TESTS:    < from: CT Abdomen and Pelvis w/ IV Cont (19 @ 04:33) >  IMPRESSION:       1. Marked fecal impaction within the rectosigmoid colon with suspected   superimposed stercoral colitis.  2. 1.2 cm exophytic hyperdense lesion arising from the lower pole the   left kidney. Additional indeterminate 1.6 cm right renal lesion. Further   evaluation with contrast-enhanced MR is recommended.  3. Intrahepatic ductal dilatation of uncertain etiology. Further   evaluation with MRI/MRCP is advised.  4. Distended gallbladder with cholelithiasis and wall hyperenhancement.   Recommend correlation with ultrasound.    < end of copied text >      Imaging Personally Reviewed:  YES    Consultant(s) Notes Reviewed:   YES    Care Discussed with Consultants :     Plan of care was discussed with patient and /or primary care giver; all questions and concerns were addressed and care was aligned with patient's wishes.

## 2019-11-07 NOTE — PROGRESS NOTE ADULT - SUBJECTIVE AND OBJECTIVE BOX
Patient is a 85y old  Female who presents with a chief complaint of Cystitis (2019 17:25)      INTERVAL HPI/OVERNIGHT EVENTS: No major overnight events. Patient seen at     MEDICATIONS  (STANDING):  ascorbic acid 500 milliGRAM(s) Oral three times a day  aspirin  chewable 81 milliGRAM(s) Oral daily  cefTRIAXone   IVPB 1000 milliGRAM(s) IV Intermittent every 24 hours  cefTRIAXone   IVPB      dextrose 5% + sodium chloride 0.9%. 1000 milliLiter(s) (60 mL/Hr) IV Continuous <Continuous>  enoxaparin Injectable 40 milliGRAM(s) SubCutaneous daily  gabapentin 200 milliGRAM(s) Oral at bedtime  insulin lispro (HumaLOG) corrective regimen sliding scale   SubCutaneous Before meals and at bedtime  lisinopril 10 milliGRAM(s) Oral daily  metoprolol succinate ER 25 milliGRAM(s) Oral daily  metroNIDAZOLE  IVPB      metroNIDAZOLE  IVPB 500 milliGRAM(s) IV Intermittent every 8 hours  polyethylene glycol 3350 17 Gram(s) Oral once  predniSONE   Tablet 6 milliGRAM(s) Oral daily  traMADol 100 milliGRAM(s) Oral at bedtime    MEDICATIONS  (PRN):  acetaminophen   Tablet .. 650 milliGRAM(s) Oral every 6 hours PRN Temp greater or equal to 38C (100.4F), Mild Pain (1 - 3)  ketorolac   Injectable 15 milliGRAM(s) IV Push every 6 hours PRN Moderate Pain (4 - 6)  morphine  - Injectable 0.5 milliGRAM(s) IV Push every 6 hours PRN Severe Pain (7 - 10)  ondansetron Injectable 4 milliGRAM(s) IV Push every 8 hours PRN Nausea and/or Vomiting      Allergies    No Known Allergies    Intolerances        REVIEW OF SYSTEMS:  CONSTITUTIONAL: No fever, weight loss, or fatigue  RESPIRATORY: No cough, wheezing, chills or hemoptysis; No shortness of breath  CARDIOVASCULAR: No chest pain, palpitations, dizziness, or leg swelling  GASTROINTESTINAL: No abdominal or epigastric pain. No nausea, vomiting, or hematemesis; No diarrhea or constipation. No melena or hematochezia.  NEUROLOGICAL: No headaches, memory loss, loss of strength, numbness, or tremors  SKIN: No itching, burning, rashes, or lesions     Vital Signs Last 24 Hrs  T(C): 37.3 (2019 05:27), Max: 37.8 (2019 18:10)  T(F): 99.1 (2019 05:27), Max: 100 (2019 18:10)  HR: 76 (2019 05:27) (71 - 98)  BP: 111/54 (2019 05:27) (111/54 - 151/67)  BP(mean): --  RR: 18 (2019 05:) (17 - 18)  SpO2: 100% (2019 05:) (100% - 100%)    PHYSICAL EXAM:  GENERAL: NAD, well-groomed, well-developed  HEAD:  Atraumatic, Normocephalic  EYES: EOMI, PERRLA, conjunctiva and sclera clear  NECK: Supple, No JVD, Normal thyroid  CHEST/LUNG: Clear to percussion bilaterally; No rales, rhonchi, wheezing, or rubs  HEART: Regular rate and rhythm; No murmurs, rubs, or gallops  ABDOMEN: Soft, Nontender, Nondistended; Bowel sounds present  NERVOUS SYSTEM:  Alert & Oriented X3, Good concentration; Motor Strength 5/5 B/L   EXTREMITIES:  2+ Peripheral Pulses, No clubbing, cyanosis, or edema  SKIN;    LABS:                        11.0   13.85 )-----------( 271      ( 2019 07:26 )             34.3         138  |  104  |  33<H>  ----------------------------<  113<H>  4.7   |  27  |  1.51<H>    Ca    8.8      2019 07:26  Phos  3.2       Mg     2.4         TPro  6.0  /  Alb  2.6<L>  /  TBili  0.5  /  DBili  x   /  AST  21  /  ALT  40  /  AlkPhos  80      PT/INR - ( 2019 03:02 )   PT: 10.7 sec;   INR: 0.97 ratio         PTT - ( 2019 03:02 )  PTT:28.4 sec  Urinalysis Basic - ( 2019 02:24 )    Color: Yellow / Appearance: Clear / S.005 / pH: x  Gluc: x / Ketone: Negative  / Bili: Negative / Urobili: Negative   Blood: x / Protein: 15 / Nitrite: Negative   Leuk Esterase: Moderate / RBC: 2-5 /HPF / WBC 11-25 /HPF   Sq Epi: x / Non Sq Epi: Few /HPF / Bacteria: Moderate /HPF      CAPILLARY BLOOD GLUCOSE      POCT Blood Glucose.: 127 mg/dL (2019 08:09)  POCT Blood Glucose.: 145 mg/dL (2019 21:23)  POCT Blood Glucose.: 131 mg/dL (2019 18:02)      RADIOLOGY & ADDITIONAL TESTS:    Imaging Personally Reviewed:  [ ] YES  [ ] NO    Consultant(s) Notes Reviewed:  [ ] YES  [ ] NO Patient is a 85y old  Female who presents with a chief complaint of Cystitis (2019 17:25)      INTERVAL HPI/OVERNIGHT EVENTS: No major overnight events. Patient seen at bedside, AXO=3, reports of feeling fine but still has pain in her knees due to osteoarthritis. She states she was passing BM yesterday which was loose but small in quantity when she felt dizzy. Then HHA noticed that stewart catheter is leaking so she came to hospital. Since yesterday she has passed 2-3 BM. She has Stewart catheter since 1 year she states she was having multiple UTI and that's why stewart catheter is inserted. She has stool impaction in past which was released by suppository.     MEDICATIONS  (STANDING):  ascorbic acid 500 milliGRAM(s) Oral three times a day  aspirin  chewable 81 milliGRAM(s) Oral daily  cefTRIAXone   IVPB 1000 milliGRAM(s) IV Intermittent every 24 hours  cefTRIAXone   IVPB      dextrose 5% + sodium chloride 0.9%. 1000 milliLiter(s) (60 mL/Hr) IV Continuous <Continuous>  enoxaparin Injectable 40 milliGRAM(s) SubCutaneous daily  gabapentin 200 milliGRAM(s) Oral at bedtime  insulin lispro (HumaLOG) corrective regimen sliding scale   SubCutaneous Before meals and at bedtime  lisinopril 10 milliGRAM(s) Oral daily  metoprolol succinate ER 25 milliGRAM(s) Oral daily  metroNIDAZOLE  IVPB      metroNIDAZOLE  IVPB 500 milliGRAM(s) IV Intermittent every 8 hours  polyethylene glycol 3350 17 Gram(s) Oral once  predniSONE   Tablet 6 milliGRAM(s) Oral daily  traMADol 100 milliGRAM(s) Oral at bedtime    MEDICATIONS  (PRN):  acetaminophen   Tablet .. 650 milliGRAM(s) Oral every 6 hours PRN Temp greater or equal to 38C (100.4F), Mild Pain (1 - 3)  ketorolac   Injectable 15 milliGRAM(s) IV Push every 6 hours PRN Moderate Pain (4 - 6)  morphine  - Injectable 0.5 milliGRAM(s) IV Push every 6 hours PRN Severe Pain (7 - 10)  ondansetron Injectable 4 milliGRAM(s) IV Push every 8 hours PRN Nausea and/or Vomiting      Allergies    No Known Allergies    Intolerances        REVIEW OF SYSTEMS:  CONSTITUTIONAL: Generalized body pain and knee pain, No fever, weight loss,   RESPIRATORY: No cough, wheezing, chills or hemoptysis; No shortness of breath  CARDIOVASCULAR: No chest pain, palpitations, dizziness, or leg swelling  GASTROINTESTINAL: Loose BM,  No abdominal or epigastric pain. No nausea, vomiting, or hematemesis;   NEUROLOGICAL: No headaches, memory loss, loss of strength, numbness, or tremors      Vital Signs Last 24 Hrs  T(C): 37.3 (2019 05:27), Max: 37.8 (2019 18:10)  T(F): 99.1 (2019 05:27), Max: 100 (2019 18:10)  HR: 76 (:) (71 - 98)  BP: 111/54 (2019 05:) (111/54 - 151/67)  BP(mean): --  RR: 18 (2019 05:27) (17 - 18)  SpO2: 100% (2019 05:) (100% - 100%)    PHYSICAL EXAM:  GENERAL: NAD,  HEAD:  Atraumatic, Normocephalic  EYES:  conjunctiva and sclera clear  NECK: Supple, No JVD, No LAD  CHEST/LUNG: Clear  to auscultation, Clear to percussion bilaterally; No rales, rhonchi, wheezing, or rubs  HEART: Regular rate and rhythm; No murmurs, rubs, or gallops  ABDOMEN: Soft, Nontender, Nondistended; Bowel sounds present  : Stewart catheter  NERVOUS SYSTEM:  Alert & Oriented X3,  EXTREMITIES: B/L Knee swollen R> L,  2+ Peripheral Pulses, No clubbing, cyanosis, or edema  SKIN; warm, dry    LABS:                        11.0   13.85 )-----------( 271      ( 2019 07:26 )             34.3         138  |  104  |  33<H>  ----------------------------<  113<H>  4.7   |  27  |  1.51<H>    Ca    8.8      2019 07:26  Phos  3.2       Mg     2.4         TPro  6.0  /  Alb  2.6<L>  /  TBili  0.5  /  DBili  x   /  AST  21  /  ALT  40  /  AlkPhos  80      PT/INR - ( 2019 03:02 )   PT: 10.7 sec;   INR: 0.97 ratio         PTT - ( 2019 03:02 )  PTT:28.4 sec  Urinalysis Basic - ( 2019 02:24 )    Color: Yellow / Appearance: Clear / S.005 / pH: x  Gluc: x / Ketone: Negative  / Bili: Negative / Urobili: Negative   Blood: x / Protein: 15 / Nitrite: Negative   Leuk Esterase: Moderate / RBC: 2-5 /HPF / WBC 11-25 /HPF   Sq Epi: x / Non Sq Epi: Few /HPF / Bacteria: Moderate /HPF      CAPILLARY BLOOD GLUCOSE      POCT Blood Glucose.: 127 mg/dL (2019 08:09)  POCT Blood Glucose.: 145 mg/dL (2019 21:23)  POCT Blood Glucose.: 131 mg/dL (2019 18:02)      RADIOLOGY & ADDITIONAL TESTS:    Imaging Personally Reviewed:  [ ] YES  [ ] NO    Consultant(s) Notes Reviewed:  [ ] YES  [ ] NO Patient is a 85y old  Female who presents with a chief complaint of Cystitis (2019 17:25)      INTERVAL HPI/OVERNIGHT EVENTS: No major overnight events. Patient seen at bedside, AXO=3, reports of feeling fine but still has pain in her knees due to osteoarthritis. She states she was passing BM yesterday which was loose but small in quantity when she felt dizzy. Then HHA noticed that stewart catheter is leaking so she came to hospital. Since yesterday she has passed 2-3 BM. She has Stewart catheter since 1 year she states she was having multiple UTI and that's why stewart catheter is inserted. She has stool impaction in past which was released by suppository.     MEDICATIONS  (STANDING):  ascorbic acid 500 milliGRAM(s) Oral three times a day  aspirin  chewable 81 milliGRAM(s) Oral daily  cefTRIAXone   IVPB 1000 milliGRAM(s) IV Intermittent every 24 hours  cefTRIAXone   IVPB      dextrose 5% + sodium chloride 0.9%. 1000 milliLiter(s) (60 mL/Hr) IV Continuous <Continuous>  enoxaparin Injectable 40 milliGRAM(s) SubCutaneous daily  gabapentin 200 milliGRAM(s) Oral at bedtime  insulin lispro (HumaLOG) corrective regimen sliding scale   SubCutaneous Before meals and at bedtime  lisinopril 10 milliGRAM(s) Oral daily  metoprolol succinate ER 25 milliGRAM(s) Oral daily  metroNIDAZOLE  IVPB      metroNIDAZOLE  IVPB 500 milliGRAM(s) IV Intermittent every 8 hours  polyethylene glycol 3350 17 Gram(s) Oral once  predniSONE   Tablet 6 milliGRAM(s) Oral daily  traMADol 100 milliGRAM(s) Oral at bedtime    MEDICATIONS  (PRN):  acetaminophen   Tablet .. 650 milliGRAM(s) Oral every 6 hours PRN Temp greater or equal to 38C (100.4F), Mild Pain (1 - 3)  ketorolac   Injectable 15 milliGRAM(s) IV Push every 6 hours PRN Moderate Pain (4 - 6)  morphine  - Injectable 0.5 milliGRAM(s) IV Push every 6 hours PRN Severe Pain (7 - 10)  ondansetron Injectable 4 milliGRAM(s) IV Push every 8 hours PRN Nausea and/or Vomiting      Allergies    No Known Allergies    Intolerances        REVIEW OF SYSTEMS:  CONSTITUTIONAL: Generalized body pain and knee pain, No fever, weight loss,   RESPIRATORY: No cough, wheezing, chills or hemoptysis; No shortness of breath  CARDIOVASCULAR: No chest pain, palpitations, dizziness, or leg swelling  GASTROINTESTINAL: Loose BM,  No abdominal or epigastric pain. No nausea, vomiting, or hematemesis;   NEUROLOGICAL: No headaches, memory loss, loss of strength, numbness, or tremors      Vital Signs Last 24 Hrs  T(C): 37.3 (2019 05:27), Max: 37.8 (2019 18:10)  T(F): 99.1 (2019 05:27), Max: 100 (2019 18:10)  HR: 76 (:) (71 - 98)  BP: 111/54 (2019 05:) (111/54 - 151/67)  BP(mean): --  RR: 18 (2019 05:27) (17 - 18)  SpO2: 100% (2019 05:) (100% - 100%)    PHYSICAL EXAM:  GENERAL: NAD,  HEAD:  Atraumatic, Normocephalic  EYES:  conjunctiva and sclera clear  NECK: Supple, No JVD, No LAD  CHEST/LUNG: Clear  to auscultation, Clear to percussion bilaterally; No rales, rhonchi, wheezing, or rubs  HEART: Regular rate and rhythm; No murmurs, rubs, or gallops  ABDOMEN: Soft, Nontender, Nondistended; Bowel sounds present  : Stewart catheter  NERVOUS SYSTEM:  Alert & Oriented X3,  EXTREMITIES: B/L Knee swollen R> L,  2+ Peripheral Pulses, No clubbing, cyanosis, or edema  SKIN; warm, dry    LABS:                        11.0   13.85 )-----------( 271      ( 2019 07:26 )             34.3         138  |  104  |  33<H>  ----------------------------<  113<H>  4.7   |  27  |  1.51<H>    Ca    8.8      2019 07:26  Phos  3.2       Mg     2.4         TPro  6.0  /  Alb  2.6<L>  /  TBili  0.5  /  DBili  x   /  AST  21  /  ALT  40  /  AlkPhos  80      PT/INR - ( 2019 03:02 )   PT: 10.7 sec;   INR: 0.97 ratio         PTT - ( 2019 03:02 )  PTT:28.4 sec  Urinalysis Basic - ( 2019 02:24 )    Color: Yellow / Appearance: Clear / S.005 / pH: x  Gluc: x / Ketone: Negative  / Bili: Negative / Urobili: Negative   Blood: x / Protein: 15 / Nitrite: Negative   Leuk Esterase: Moderate / RBC: 2-5 /HPF / WBC 11-25 /HPF   Sq Epi: x / Non Sq Epi: Few /HPF / Bacteria: Moderate /HPF    POCT Blood Glucose.: 127 mg/dL (2019 08:09)  POCT Blood Glucose.: 145 mg/dL (2019 21:23)  POCT Blood Glucose.: 131 mg/dL (2019 18:02)      RADIOLOGY & ADDITIONAL TESTS:

## 2019-11-07 NOTE — DISCHARGE NOTE PROVIDER - NSDCMRMEDTOKEN_GEN_ALL_CORE_FT
acetaminophen 325 mg oral tablet: 2 tab(s) orally every 6 hours, As needed, Temp greater or equal to 38C (100.4F), Mild Pain (1 - 3)  aspirin:   Coenzyme Q10 100 mg oral capsule: 1 cap(s) orally once a day  gabapentin:   lisinopril:   metoprolol:   metoprolol succinate 25 mg oral tablet, extended release: 1 tab(s) orally once a day  predniSONE:   Probiotic Formula oral capsule: 1 cap(s) orally once a day  traMADol:   Vitamin C 100 mg oral tablet: 50 milligram(s) orally 3 times a day acetaminophen 325 mg oral tablet: 2 tab(s) orally every 6 hours, As needed, Temp greater or equal to 38C (100.4F), Mild Pain (1 - 3)  aspirin:   Coenzyme Q10 100 mg oral capsule: 1 cap(s) orally once a day  gabapentin 100 mg oral capsule: 2 cap(s) orally once a day (at bedtime)  lisinopril 10 mg oral tablet: 1 tab(s) orally once a day  metoprolol:   metoprolol succinate 25 mg oral tablet, extended release: 1 tab(s) orally once a day  Probiotic Formula oral capsule: 1 cap(s) orally once a day  traMADol:   Vitamin C 100 mg oral tablet: 50 milligram(s) orally 3 times a day acetaminophen 325 mg oral tablet: 2 tab(s) orally every 6 hours, As needed, Temp greater or equal to 38C (100.4F), Mild Pain (1 - 3)  aspirin:   cefuroxime 250 mg oral tablet: 1 tab(s) orally 2 times a day   Coenzyme Q10 100 mg oral capsule: 1 cap(s) orally once a day  Flagyl 500 mg oral tablet: 1 tab(s) orally 3 times a day   gabapentin 100 mg oral capsule: 2 cap(s) orally once a day (at bedtime)  lisinopril 10 mg oral tablet: 1 tab(s) orally once a day  metoprolol:   metoprolol succinate 25 mg oral tablet, extended release: 1 tab(s) orally once a day  MiraLax oral powder for reconstitution: 17 gram(s) orally every 48 hours if no bowel movement  predniSONE 2 mg oral delayed release tablet: 2 tab(s) orally once a day   Probiotic Formula oral capsule: 1 cap(s) orally once a day  senna oral tablet: 2 tab(s) orally once a day (at bedtime)   traMADol:   Vitamin C 100 mg oral tablet: 50 milligram(s) orally 3 times a day acetaminophen 325 mg oral tablet: 2 tab(s) orally every 6 hours, As needed, Temp greater or equal to 38C (100.4F), Mild Pain (1 - 3)  aspirin:   cefuroxime 250 mg oral tablet: 1 tab(s) orally 2 times a day   Coenzyme Q10 100 mg oral capsule: 1 cap(s) orally once a day  Flagyl 500 mg oral tablet: 1 tab(s) orally 3 times a day   gabapentin 100 mg oral capsule: 2 cap(s) orally once a day (at bedtime)  lisinopril 10 mg oral tablet: 1 tab(s) orally once a day  metoprolol succinate 25 mg oral tablet, extended release: 1 tab(s) orally once a day  MiraLax oral powder for reconstitution: 17 gram(s) orally every 48 hours if no bowel movement  predniSONE 2 mg oral delayed release tablet: 2 tab(s) orally once a day   Probiotic Formula oral capsule: 1 cap(s) orally once a day  senna oral tablet: 2 tab(s) orally once a day (at bedtime)   traMADol: Please resume home dose as before only for severe pain  Vitamin C 100 mg oral tablet: 50 milligram(s) orally 3 times a day

## 2019-11-07 NOTE — PROGRESS NOTE ADULT - PROBLEM SELECTOR PLAN 2
-CT abdomen and Ultrasound abdomen shows Distended gallbladder.   -Rios sign negative for acute choleycystitis.  -No complaints of abdominal pain  -Monitor for now  -C/w rocephin and flagyl

## 2019-11-07 NOTE — DISCHARGE NOTE PROVIDER - CARE PROVIDER_API CALL
Nestor Prajapati (MD)  Surgery  [address] [place], [place] [id number]  Phone: [phone]  Fax: [phone]  Follow Up Time:

## 2019-11-07 NOTE — PHYSICAL THERAPY INITIAL EVALUATION ADULT - ADDITIONAL COMMENTS
Pt reports she has 24/7 HHA. Pt uses a wheelchair for community mobility and a RW for short distance ambulation in the home. Her HHA assists with all ADLs. Pt has shower chair and hospital bed in the home.

## 2019-11-07 NOTE — PROGRESS NOTE ADULT - PROBLEM SELECTOR PLAN 3
p/w complicated UTI due to chronic stewart catheter p/w complicated UTI due to chronic stewart catheter.  Urine culture shows potential contamination  Stewart catheter changed in ED  c/w rocephin and flagyl p/w complicated UTI due to chronic stewart catheter.  Urine culture shows potential contamination, f/u repeat UC  Stewart catheter changed in ED  c/w rocephin and flagyl

## 2019-11-07 NOTE — DISCHARGE NOTE PROVIDER - HOSPITAL COURSE
85 year old female from home lives with daughter with private HHA 24hr/7 days bedbound at baseline walks with help with PMHx OA, CAD, DM, HTN, Spinal Stenosis with chronic Stewart catheter in place and PSH of Hysterectomy presenting with leaking around Stewart catheter and suprapubic pressure since 1 day. Patient is admitted for stercoral colitis and replacement of stewart catheter and was suspicion of acute cholecystitis. Stewart catheter was replaced. CT abdomen shows impacted stool and stercoral colitis and cholelithiasis. Patient was started on IV rocephin and flagyl for colitis and complicated cystitis due to indwelling stewart catheter. Acute cholecystitis was ruled out by ultrasound and negative Rios sign of physical exam. Surgery recommended for cholelithiasis but family opted for conservative mearsure. Urine culture shows? Patient clinically improved

## 2019-11-08 LAB
ALBUMIN SERPL ELPH-MCNC: 2.2 G/DL — LOW (ref 3.5–5)
ALP SERPL-CCNC: 66 U/L — SIGNIFICANT CHANGE UP (ref 40–120)
ALT FLD-CCNC: 30 U/L DA — SIGNIFICANT CHANGE UP (ref 10–60)
ANION GAP SERPL CALC-SCNC: 7 MMOL/L — SIGNIFICANT CHANGE UP (ref 5–17)
AST SERPL-CCNC: 18 U/L — SIGNIFICANT CHANGE UP (ref 10–40)
BASOPHILS # BLD AUTO: 0.04 K/UL — SIGNIFICANT CHANGE UP (ref 0–0.2)
BASOPHILS NFR BLD AUTO: 0.4 % — SIGNIFICANT CHANGE UP (ref 0–2)
BILIRUB SERPL-MCNC: 0.3 MG/DL — SIGNIFICANT CHANGE UP (ref 0.2–1.2)
BUN SERPL-MCNC: 27 MG/DL — HIGH (ref 7–18)
CALCIUM SERPL-MCNC: 8.1 MG/DL — LOW (ref 8.4–10.5)
CHLORIDE SERPL-SCNC: 102 MMOL/L — SIGNIFICANT CHANGE UP (ref 96–108)
CO2 SERPL-SCNC: 23 MMOL/L — SIGNIFICANT CHANGE UP (ref 22–31)
CREAT SERPL-MCNC: 1.12 MG/DL — SIGNIFICANT CHANGE UP (ref 0.5–1.3)
EOSINOPHIL # BLD AUTO: 0.47 K/UL — SIGNIFICANT CHANGE UP (ref 0–0.5)
EOSINOPHIL NFR BLD AUTO: 4.1 % — SIGNIFICANT CHANGE UP (ref 0–6)
GLUCOSE BLDC GLUCOMTR-MCNC: 111 MG/DL — HIGH (ref 70–99)
GLUCOSE BLDC GLUCOMTR-MCNC: 165 MG/DL — HIGH (ref 70–99)
GLUCOSE BLDC GLUCOMTR-MCNC: 169 MG/DL — HIGH (ref 70–99)
GLUCOSE BLDC GLUCOMTR-MCNC: 189 MG/DL — HIGH (ref 70–99)
GLUCOSE SERPL-MCNC: 86 MG/DL — SIGNIFICANT CHANGE UP (ref 70–99)
HCT VFR BLD CALC: 28 % — LOW (ref 34.5–45)
HGB BLD-MCNC: 9.3 G/DL — LOW (ref 11.5–15.5)
IMM GRANULOCYTES NFR BLD AUTO: 0.4 % — SIGNIFICANT CHANGE UP (ref 0–1.5)
LYMPHOCYTES # BLD AUTO: 19.1 % — SIGNIFICANT CHANGE UP (ref 13–44)
LYMPHOCYTES # BLD AUTO: 2.17 K/UL — SIGNIFICANT CHANGE UP (ref 1–3.3)
MAGNESIUM SERPL-MCNC: 2 MG/DL — SIGNIFICANT CHANGE UP (ref 1.6–2.6)
MCHC RBC-ENTMCNC: 30.3 PG — SIGNIFICANT CHANGE UP (ref 27–34)
MCHC RBC-ENTMCNC: 33.2 GM/DL — SIGNIFICANT CHANGE UP (ref 32–36)
MCV RBC AUTO: 91.2 FL — SIGNIFICANT CHANGE UP (ref 80–100)
MONOCYTES # BLD AUTO: 0.75 K/UL — SIGNIFICANT CHANGE UP (ref 0–0.9)
MONOCYTES NFR BLD AUTO: 6.6 % — SIGNIFICANT CHANGE UP (ref 2–14)
NEUTROPHILS # BLD AUTO: 7.86 K/UL — HIGH (ref 1.8–7.4)
NEUTROPHILS NFR BLD AUTO: 69.4 % — SIGNIFICANT CHANGE UP (ref 43–77)
NRBC # BLD: 0 /100 WBCS — SIGNIFICANT CHANGE UP (ref 0–0)
PHOSPHATE SERPL-MCNC: 2.8 MG/DL — SIGNIFICANT CHANGE UP (ref 2.5–4.5)
PLATELET # BLD AUTO: 198 K/UL — SIGNIFICANT CHANGE UP (ref 150–400)
POTASSIUM SERPL-MCNC: 4.2 MMOL/L — SIGNIFICANT CHANGE UP (ref 3.5–5.3)
POTASSIUM SERPL-SCNC: 4.2 MMOL/L — SIGNIFICANT CHANGE UP (ref 3.5–5.3)
PROT SERPL-MCNC: 5.4 G/DL — LOW (ref 6–8.3)
RBC # BLD: 3.07 M/UL — LOW (ref 3.8–5.2)
RBC # FLD: 14.5 % — SIGNIFICANT CHANGE UP (ref 10.3–14.5)
SODIUM SERPL-SCNC: 132 MMOL/L — LOW (ref 135–145)
WBC # BLD: 11.34 K/UL — HIGH (ref 3.8–10.5)
WBC # FLD AUTO: 11.34 K/UL — HIGH (ref 3.8–10.5)

## 2019-11-08 PROCEDURE — 99232 SBSQ HOSP IP/OBS MODERATE 35: CPT

## 2019-11-08 PROCEDURE — 99233 SBSQ HOSP IP/OBS HIGH 50: CPT | Mod: GC

## 2019-11-08 RX ORDER — METRONIDAZOLE 500 MG
1 TABLET ORAL
Qty: 15 | Refills: 0
Start: 2019-11-08 | End: 2019-11-12

## 2019-11-08 RX ORDER — LISINOPRIL 2.5 MG/1
1 TABLET ORAL
Qty: 0 | Refills: 0 | DISCHARGE
Start: 2019-11-08

## 2019-11-08 RX ORDER — SENNA PLUS 8.6 MG/1
2 TABLET ORAL
Qty: 60 | Refills: 0
Start: 2019-11-08 | End: 2019-12-07

## 2019-11-08 RX ORDER — POLYETHYLENE GLYCOL 3350 17 G/17G
17 POWDER, FOR SOLUTION ORAL
Qty: 5000 | Refills: 0
Start: 2019-11-08

## 2019-11-08 RX ORDER — CEFUROXIME AXETIL 250 MG
1 TABLET ORAL
Qty: 10 | Refills: 0
Start: 2019-11-08 | End: 2019-11-12

## 2019-11-08 RX ORDER — GABAPENTIN 400 MG/1
2 CAPSULE ORAL
Qty: 0 | Refills: 0 | DISCHARGE
Start: 2019-11-08

## 2019-11-08 RX ADMIN — Medication 1: at 12:18

## 2019-11-08 RX ADMIN — GABAPENTIN 200 MILLIGRAM(S): 400 CAPSULE ORAL at 21:12

## 2019-11-08 RX ADMIN — Medication 100 MILLIGRAM(S): at 14:45

## 2019-11-08 RX ADMIN — Medication 500 MILLIGRAM(S): at 05:41

## 2019-11-08 RX ADMIN — Medication 650 MILLIGRAM(S): at 14:58

## 2019-11-08 RX ADMIN — Medication 650 MILLIGRAM(S): at 16:54

## 2019-11-08 RX ADMIN — Medication 6 MILLIGRAM(S): at 05:46

## 2019-11-08 RX ADMIN — ENOXAPARIN SODIUM 40 MILLIGRAM(S): 100 INJECTION SUBCUTANEOUS at 12:17

## 2019-11-08 RX ADMIN — CEFTRIAXONE 100 MILLIGRAM(S): 500 INJECTION, POWDER, FOR SOLUTION INTRAMUSCULAR; INTRAVENOUS at 12:17

## 2019-11-08 RX ADMIN — Medication 81 MILLIGRAM(S): at 12:17

## 2019-11-08 RX ADMIN — SODIUM CHLORIDE 60 MILLILITER(S): 9 INJECTION INTRAMUSCULAR; INTRAVENOUS; SUBCUTANEOUS at 05:42

## 2019-11-08 RX ADMIN — Medication 1: at 17:02

## 2019-11-08 RX ADMIN — Medication 100 MILLIGRAM(S): at 21:12

## 2019-11-08 RX ADMIN — Medication 15 MILLIGRAM(S): at 02:06

## 2019-11-08 RX ADMIN — Medication 100 MILLIGRAM(S): at 05:41

## 2019-11-08 RX ADMIN — Medication 500 MILLIGRAM(S): at 21:12

## 2019-11-08 RX ADMIN — Medication 500 MILLIGRAM(S): at 14:46

## 2019-11-08 RX ADMIN — TRAMADOL HYDROCHLORIDE 100 MILLIGRAM(S): 50 TABLET ORAL at 21:11

## 2019-11-08 RX ADMIN — Medication 15 MILLIGRAM(S): at 02:36

## 2019-11-08 RX ADMIN — TRAMADOL HYDROCHLORIDE 100 MILLIGRAM(S): 50 TABLET ORAL at 21:41

## 2019-11-08 NOTE — PROGRESS NOTE ADULT - PROBLEM SELECTOR PLAN 8
RISK                                                          Points  [  ] Previous VTE                                                3  [  ] Thrombophilia                                             2  [  ] Lower limb paralysis                                   2        (unable to hold up >15 seconds)    [  ] Current Cancer                                             2         (within 6 months)  [ x ] Immobilization > 24 hrs                              1  [  ] ICU/CCU stay > 24 hours                             1  [ x ] Age > 60                                                         1    IMPROVE VTE Score: 2  lovenox for VTE prophylaxis.
-IMPROVE VTE Score: 2  -Lovenox for VTE prophylaxis.    RISK                                                          Points  [  ] Previous VTE                                                3  [  ] Thrombophilia                                             2  [  ] Lower limb paralysis                                   2        (unable to hold up >15 seconds)    [  ] Current Cancer                                             2         (within 6 months)  [ x ] Immobilization > 24 hrs                              1  [  ] ICU/CCU stay > 24 hours                             1  [ x ] Age > 60                                                         1
-IMPROVE VTE Score: 2  -Lovenox for VTE prophylaxis.    RISK                                                          Points  [  ] Previous VTE                                                3  [  ] Thrombophilia                                             2  [  ] Lower limb paralysis                                   2        (unable to hold up >15 seconds)    [  ] Current Cancer                                             2         (within 6 months)  [ x ] Immobilization > 24 hrs                              1  [  ] ICU/CCU stay > 24 hours                             1  [ x ] Age > 60                                                         1
RISK                                                          Points  [  ] Previous VTE                                                3  [  ] Thrombophilia                                             2  [  ] Lower limb paralysis                                   2        (unable to hold up >15 seconds)    [  ] Current Cancer                                             2         (within 6 months)  [ x ] Immobilization > 24 hrs                              1  [  ] ICU/CCU stay > 24 hours                             1  [ x ] Age > 60                                                         1    IMPROVE VTE Score: 2  lovenox for VTE prophylaxis.

## 2019-11-08 NOTE — PROGRESS NOTE ADULT - PROBLEM SELECTOR PLAN 2
-CT abdomen and Ultrasound abdomen shows Distended gallbladder.   -Rios sign negative for acute choleycystitis.  -No complaints of abdominal pain  -Monitor for now  -C/w rocephin and flagyl  -F/u outpatient

## 2019-11-08 NOTE — PROGRESS NOTE ADULT - PROBLEM SELECTOR PLAN 6
-BS within range 100-147  -Insulin sliding scale  -HBA1C 6.4
-BS within range 100-189  -Insulin sliding scale  -HBA1C 6.4
BS within range 100-147  c/ insulin sliding scale
BS within range 100-147  c/ insulin sliding scale  F/u HBA1C

## 2019-11-08 NOTE — PROGRESS NOTE ADULT - PROBLEM SELECTOR PLAN 1
CT abdomen shows impacted stool and stercoral colitis  likely from chronic constipation   Started IV rocephin and flagyl day 2  Will advanced diet to soft  had normal bowel movement   afebrile, HD stable and leucocytosis improving   -DC planning in Prgress on oral ABx for 7 days

## 2019-11-08 NOTE — PROGRESS NOTE ADULT - PROBLEM SELECTOR PLAN 1
-Chronic constipation with episode of loose stools likely 2/2 excessive laxative use  -CT Abd/Pelvis: "Marked fecal impaction within the rectosigmoid colon with suspected superimposed stercoral colitis."  -C/w IV rocephin and flagyl (also for concern of cholecystitis but US liver negative)  -Leukocytosis improving, WBC 11.34 (WBC 16.51 on admission)  -Normal BM last night.   -Continue to hold laxative for now  -Tolerating soft diet  -Oral axbx 7x days  -D/c in progress

## 2019-11-08 NOTE — PROGRESS NOTE ADULT - PROBLEM SELECTOR PLAN 2
CT abdomen and Ultrasound abdomen shows Distended gallbladder.   Rios sign negative for acute choleycystitis. Patient is not complaining of abdominal pain  asymptomatic gall stones   needs outpatient follow up

## 2019-11-08 NOTE — PROGRESS NOTE ADULT - PROBLEM SELECTOR PLAN 3
p/w complicated UTI due to chronic stewart catheter.  Urine culture shows potential contamination, f/u repeat UC  Stewart catheter changed in ED  c/w rocephin and flagyl  previous urine showed contamination   f/u repeat urine cx  blood cx neg

## 2019-11-08 NOTE — PROGRESS NOTE ADULT - ASSESSMENT
85 year old female from home lives with daughter with private HHA 24hr/7 days bedbound at baseline walks with help with PMHx OA, CAD, DM, HTN, Spinal Stenosis with chronic Stewart catheter in place and PSH of Hysterectomy presenting with leaking around Stewart catheter and suprapubic pressure since 1 day. Patient is admitted for stercoral colitis and replacement of stewart catheter and was suspicion of acute cholecystitis

## 2019-11-08 NOTE — PROGRESS NOTE ADULT - PROBLEM SELECTOR PLAN 7
-Patient has severe osteoarthritis on chronic prednisone, followed by Dr. Yu (rheumatology)  -C/w pain management
-Patient has severe osteoarthritis on chronic prednisone, followed by Dr. Yu (rheumatology)  -C/w pain management  -Decrease prednisone to 5mg tomorrow morning; 4mg outpatient dose on d/c
Patient has severe osteoarthritis on chronic prednisone  c/w pain management  - continue with steroids
Patient has severe osteoarthritis on chronic prednisone  c/w pain management

## 2019-11-08 NOTE — PROGRESS NOTE ADULT - SUBJECTIVE AND OBJECTIVE BOX
4th Year Medical Student Note discussed with supervising resident and primary attending    Patient is a 85y old  Female who presents with a chief complaint of Cystitis (08 Nov 2019 12:02)      INTERVAL HPI/OVERNIGHT EVENTS: ]  Pt seen and evaluated at bedside. Sitting comfortably in no acute distress. No acute overnight events or new complaints. Symptoms resolving. Reports a normal bowel movement overnight. Denies headache, chest pain, palpitations, shortness of breath, abdominal pain, or nausea or vomiting.    MEDICATIONS  (STANDING):  ascorbic acid 500 milliGRAM(s) Oral three times a day  aspirin  chewable 81 milliGRAM(s) Oral daily  cefTRIAXone   IVPB 1000 milliGRAM(s) IV Intermittent every 24 hours  cefTRIAXone   IVPB      dextrose 5% + sodium chloride 0.9%. 1000 milliLiter(s) (60 mL/Hr) IV Continuous <Continuous>  enoxaparin Injectable 40 milliGRAM(s) SubCutaneous daily  gabapentin 200 milliGRAM(s) Oral at bedtime  insulin lispro (HumaLOG) corrective regimen sliding scale   SubCutaneous Before meals and at bedtime  lisinopril 10 milliGRAM(s) Oral daily  metoprolol succinate ER 25 milliGRAM(s) Oral daily  metroNIDAZOLE  IVPB      metroNIDAZOLE  IVPB 500 milliGRAM(s) IV Intermittent every 8 hours  polyethylene glycol 3350 17 Gram(s) Oral once  predniSONE   Tablet 4 milliGRAM(s) Oral daily  sodium chloride 0.9%. 1000 milliLiter(s) (60 mL/Hr) IV Continuous <Continuous>  traMADol 100 milliGRAM(s) Oral at bedtime    MEDICATIONS  (PRN):  acetaminophen   Tablet .. 650 milliGRAM(s) Oral every 6 hours PRN Temp greater or equal to 38C (100.4F), Mild Pain (1 - 3)  ketorolac   Injectable 15 milliGRAM(s) IV Push every 6 hours PRN Moderate Pain (4 - 6)  ondansetron Injectable 4 milliGRAM(s) IV Push every 8 hours PRN Nausea and/or Vomiting      __________________________________________________  REVIEW OF SYSTEMS:    CONSTITUTIONAL: No fever,   EYES: no acute visual disturbances  NECK: No pain or stiffness  RESPIRATORY: No cough; No shortness of breath  CARDIOVASCULAR: No chest pain, no palpitations  GASTROINTESTINAL: No pain. No nausea or vomiting; No diarrhea   NEUROLOGICAL: No headache or numbness, no tremors  MUSCULOSKELETAL: +B/L knee pain 2/2 arthritis  GENITOURINARY: no dysuria, no frequency, no hesitancy  PSYCHIATRY: no depression , no anxiety  ALL OTHER  ROS negative        Vital Signs Last 24 Hrs  T(C): 36.7 (08 Nov 2019 13:23), Max: 36.8 (08 Nov 2019 05:26)  T(F): 98 (08 Nov 2019 13:23), Max: 98.2 (08 Nov 2019 05:26)  HR: 59 (08 Nov 2019 13:23) (50 - 64)  BP: 130/50 (08 Nov 2019 13:23) (117/50 - 131/65)  BP(mean): --  RR: 18 (08 Nov 2019 13:23) (17 - 18)  SpO2: 98% (08 Nov 2019 13:23) (98% - 99%)    ________________________________________________  PHYSICAL EXAM:  GENERAL: NAD  HEENT: Normocephalic;  conjunctivae and sclerae clear; moist mucous membranes;   NECK : supple  CHEST/LUNG: Clear to auscultation bilaterally with good air entry   HEART: S1 S2  regular; no murmurs, gallops or rubs  ABDOMEN: Soft, Nontender, Nondistended; Bowel sounds present  EXTREMITIES: +B/L knee swelling R > L. No cyanosis; no edema; no calf tenderness  SKIN: warm and dry; no rash  NERVOUS SYSTEM:  Awake and alert; Oriented  to place, person and time ; no new deficits    _________________________________________________  LABS:                        9.3    11.34 )-----------( 198      ( 08 Nov 2019 04:40 )             28.0     11-08    132<L>  |  102  |  27<H>  ----------------------------<  86  4.2   |  23  |  1.12    Ca    8.1<L>      08 Nov 2019 04:40  Phos  2.8     11-08  Mg     2.0     11-08    TPro  5.4<L>  /  Alb  2.2<L>  /  TBili  0.3  /  DBili  x   /  AST  18  /  ALT  30  /  AlkPhos  66  11-08        CAPILLARY BLOOD GLUCOSE      POCT Blood Glucose.: 189 mg/dL (08 Nov 2019 11:42)  POCT Blood Glucose.: 111 mg/dL (08 Nov 2019 08:13)  POCT Blood Glucose.: 133 mg/dL (07 Nov 2019 21:16)  POCT Blood Glucose.: 144 mg/dL (07 Nov 2019 16:17)        RADIOLOGY & ADDITIONAL TESTS:    < from: CT Abdomen and Pelvis w/ IV Cont (11.06.19 @ 04:33) >  IMPRESSION:       1. Marked fecal impaction within the rectosigmoid colon with suspected   superimposed stercoral colitis.  2. 1.2 cm exophytic hyperdense lesion arising from the lower pole the   left kidney. Additional indeterminate 1.6 cm right renal lesion. Further   evaluation with contrast-enhanced MR is recommended.  3. Intrahepatic ductal dilatation of uncertain etiology. Further   evaluation with MRI/MRCP is advised.  4. Distended gallbladder with cholelithiasis and wall hyperenhancement.   Recommend correlation with ultrasound.    < end of copied text >      Imaging Personally Reviewed:  YES    Consultant(s) Notes Reviewed:   YES    Care Discussed with Consultants :     Plan of care was discussed with patient and /or primary care giver; all questions and concerns were addressed and care was aligned with patient's wishes.

## 2019-11-08 NOTE — PROGRESS NOTE ADULT - ATTENDING COMMENTS
Patient seen and examined with PGY2 MAR and MS4; Agree with PGY1 A/P above with editing as needed. My independent assessment, findings on exam, diagnosis and plan of care as listed below    Patient admitted with abdominal pain and vomiting. patient initially brought to ED for leaking stewart catheter. She has a chronic stewart which was replaced in ED.  She is followed by House calls MD. She also has long standing Constipation develops diarrhea after laxatives. She was found to have fecal impaction and Stercoral colitis on CT. Patient had upper abdominal pain on right side as well as some suprapubic pain on admission and had vomited twice in ED.     Patient is clinically doing much better. She feels good. OOB to chair. No abdominal pain. Tolerated regular diet. No fever. No new complaints.    Vital Signs Last 24 Hrs  T(C): 36.7 (08 Nov 2019 13:23), Max: 36.8 (08 Nov 2019 05:26)  T(F): 98 (08 Nov 2019 13:23), Max: 98.2 (08 Nov 2019 05:26)  HR: 59 (08 Nov 2019 13:23) (50 - 64)  BP: 130/50 (08 Nov 2019 13:23) (117/50 - 131/65)  RR: 18 (08 Nov 2019 13:23) (17 - 18)  SpO2: 98% (08 Nov 2019 13:23) (98% - 99%)    P/E:  Neuro: AAO x3; No gross focal neurological deficits  CVS: S1S2 present  Resp: BLAE+, No wheeze or Rhonchi  GI: Soft, BS+, Tenderness RUQ on deep palpation  Extr; No edema or calf tenderness B/L LE    Labs:                        9.3    11.34 )-----------( 198      ( 08 Nov 2019 04:40 )             28.0   11-08    132<L>  |  102  |  27<H>  ----------------------------<  86  4.2   |  23  |  1.12    Ca    8.1<L>      08 Nov 2019 04:40  Phos  2.8     11-08  Mg     2.0     11-08    TPro  5.4<L>  /  Alb  2.2<L>  /  TBili  0.3  /  DBili  x   /  AST  18  /  ALT  30  /  AlkPhos  66  11-08     D/D;  RUQ pain with Leucocytosis and Cholelithiasis concern for Cholecystitis resolved pain  Possible Leucocytosis from UTI given chronic indwelling Stewart catheter resolving well  Abdominal Pain also possible from Fecal impaction  Renal cyst   Type 2 DM  HTN    Plan:  Patient is clinically much improved;   Sx follow up appreciated; No Sx intervention at this time; F/U outpatient if recurrent RUQ pain.  Continue Ceftriaxone and Flagyl for potential subclinical Cholecystitis and UTI coverage   Follow up Blood and Urine Cx   Will need Stool softeners routine and laxatives as needed on discharge  Await repeat Urine Cx results; Initial one contaminated;   PT evaluation noted; no skilled needs as per PT at baseline.   Given patient limited functional status at baseline and family would want conservative approach will hold off further wotk up of the renal cyst seen on CT. May consider outpatient Urology folow up    Discussed with patient findings and plan of care; D/C Home tomorrow with resumption of Pvt HHA  Discussed with PGY3 Dr. Reid and RN

## 2019-11-08 NOTE — PROGRESS NOTE ADULT - PROBLEM SELECTOR PLAN 4
CT Abdomen showed incidental 1.2 cm exophytic hyperdense lesion arising from the lower pole the left kidney. Additional indeterminate 1.6 cm right renal lesion.
-CT Abd/Pel: "1.2 cm exophytic hyperdense lesion arising from the lower pole the left kidney. Additional indeterminate 1.6 cm right renal lesion. Further evaluation with contrast-enhanced MR is recommended."
-CT Abd/Pel: "1.2 cm exophytic hyperdense lesion arising from the lower pole the left kidney. Additional indeterminate 1.6 cm right renal lesion. Further evaluation with contrast-enhanced MR is recommended."
CT Abdomen showed incidental 1.2 cm exophytic hyperdense lesion arising from the lower pole the left kidney. Additional indeterminate 1.6 cm right renal lesion.

## 2019-11-08 NOTE — PROGRESS NOTE ADULT - PROBLEM SELECTOR PLAN 5
-BP stable   -C/w metoprolol
BP stable   c/w metoprolol
-BP stable   -C/w metoprolol
BP stable   c/w metoprolol

## 2019-11-08 NOTE — PROGRESS NOTE ADULT - SUBJECTIVE AND OBJECTIVE BOX
PGY 3 Note discussed with  primary attending    Patient is a 85y old  Female who presents with a chief complaint of Cystitis (07 Nov 2019 14:58)      INTERVAL HPI/OVERNIGHT EVENTS: Patient seen and examined at bed side, sitting comfortable in bed, patient tolerated soft diet, no abdominal pain, had normal bowel movement overnight,  offers no new complaints; current symptoms resolving    MEDICATIONS  (STANDING):  ascorbic acid 500 milliGRAM(s) Oral three times a day  aspirin  chewable 81 milliGRAM(s) Oral daily  cefTRIAXone   IVPB 1000 milliGRAM(s) IV Intermittent every 24 hours  cefTRIAXone   IVPB      dextrose 5% + sodium chloride 0.9%. 1000 milliLiter(s) (60 mL/Hr) IV Continuous <Continuous>  enoxaparin Injectable 40 milliGRAM(s) SubCutaneous daily  gabapentin 200 milliGRAM(s) Oral at bedtime  insulin lispro (HumaLOG) corrective regimen sliding scale   SubCutaneous Before meals and at bedtime  lisinopril 10 milliGRAM(s) Oral daily  metoprolol succinate ER 25 milliGRAM(s) Oral daily  metroNIDAZOLE  IVPB      metroNIDAZOLE  IVPB 500 milliGRAM(s) IV Intermittent every 8 hours  polyethylene glycol 3350 17 Gram(s) Oral once  predniSONE   Tablet 6 milliGRAM(s) Oral daily  sodium chloride 0.9%. 1000 milliLiter(s) (60 mL/Hr) IV Continuous <Continuous>  traMADol 100 milliGRAM(s) Oral at bedtime    MEDICATIONS  (PRN):  acetaminophen   Tablet .. 650 milliGRAM(s) Oral every 6 hours PRN Temp greater or equal to 38C (100.4F), Mild Pain (1 - 3)  ketorolac   Injectable 15 milliGRAM(s) IV Push every 6 hours PRN Moderate Pain (4 - 6)  ondansetron Injectable 4 milliGRAM(s) IV Push every 8 hours PRN Nausea and/or Vomiting      Vital Signs Last 24 Hrs  T(C): 36.8 (08 Nov 2019 05:26), Max: 36.8 (07 Nov 2019 12:59)  T(F): 98.2 (08 Nov 2019 05:26), Max: 98.3 (07 Nov 2019 12:59)  HR: 54 (08 Nov 2019 05:58) (50 - 80)  BP: 117/50 (08 Nov 2019 05:26) (101/53 - 131/65)  BP(mean): --  RR: 17 (08 Nov 2019 05:26) (17 - 18)  SpO2: 99% (08 Nov 2019 05:26) (99% - 99%)    ________________________________________________  PHYSICAL EXAM:  GENERAL: NAD,  HEAD:  Atraumatic, Normocephalic  EYES:  conjunctiva and sclera clear  NECK: Supple, No JVD, No LAD  CHEST/LUNG: Clear  to auscultation, Clear to percussion bilaterally; No rales, rhonchi, wheezing, or rubs  HEART: Regular rate and rhythm; No murmurs, rubs, or gallops  ABDOMEN: Soft, Nontender, Nondistended; Bowel sounds present  : Rosario catheter  NERVOUS SYSTEM:  Alert & Oriented X3,  EXTREMITIES: B/L Knee swollen R> L,  2+ Peripheral Pulses, No clubbing, cyanosis, or edema  SKIN; warm, dry    _________________________________________________  LABS:                        9.3    11.34 )-----------( 198      ( 08 Nov 2019 04:40 )             28.0     11-08    132<L>  |  102  |  27<H>  ----------------------------<  86  4.2   |  23  |  1.12    Ca    8.1<L>      08 Nov 2019 04:40  Phos  2.8     11-08  Mg     2.0     11-08    TPro  5.4<L>  /  Alb  2.2<L>  /  TBili  0.3  /  DBili  x   /  AST  18  /  ALT  30  /  AlkPhos  66  11-08        CAPILLARY BLOOD GLUCOSE      POCT Blood Glucose.: 189 mg/dL (08 Nov 2019 11:42)  POCT Blood Glucose.: 111 mg/dL (08 Nov 2019 08:13)  POCT Blood Glucose.: 133 mg/dL (07 Nov 2019 21:16)  POCT Blood Glucose.: 144 mg/dL (07 Nov 2019 16:17)  POCT Blood Glucose.: 213 mg/dL (07 Nov 2019 12:03)        RADIOLOGY & ADDITIONAL TESTS:            Plan of care was discussed with patient and /or primary care giver; all questions and concerns were addressed and care was aligned with patient's wishes. PGY 3 Note discussed with  primary attending    Patient is a 85y old  Female who presents with a chief complaint of Cystitis (07 Nov 2019 14:58)      INTERVAL HPI/OVERNIGHT EVENTS: Patient seen and examined at bed side, sitting comfortable in bed, patient tolerated soft diet, no abdominal pain, had normal bowel movement overnight,  offers no new complaints; current symptoms resolving    MEDICATIONS  (STANDING):  ascorbic acid 500 milliGRAM(s) Oral three times a day  aspirin  chewable 81 milliGRAM(s) Oral daily  cefTRIAXone   IVPB 1000 milliGRAM(s) IV Intermittent every 24 hours  cefTRIAXone   IVPB      dextrose 5% + sodium chloride 0.9%. 1000 milliLiter(s) (60 mL/Hr) IV Continuous <Continuous>  enoxaparin Injectable 40 milliGRAM(s) SubCutaneous daily  gabapentin 200 milliGRAM(s) Oral at bedtime  insulin lispro (HumaLOG) corrective regimen sliding scale   SubCutaneous Before meals and at bedtime  lisinopril 10 milliGRAM(s) Oral daily  metoprolol succinate ER 25 milliGRAM(s) Oral daily  metroNIDAZOLE  IVPB      metroNIDAZOLE  IVPB 500 milliGRAM(s) IV Intermittent every 8 hours  polyethylene glycol 3350 17 Gram(s) Oral once  predniSONE   Tablet 6 milliGRAM(s) Oral daily  sodium chloride 0.9%. 1000 milliLiter(s) (60 mL/Hr) IV Continuous <Continuous>  traMADol 100 milliGRAM(s) Oral at bedtime    MEDICATIONS  (PRN):  acetaminophen   Tablet .. 650 milliGRAM(s) Oral every 6 hours PRN Temp greater or equal to 38C (100.4F), Mild Pain (1 - 3)  ketorolac   Injectable 15 milliGRAM(s) IV Push every 6 hours PRN Moderate Pain (4 - 6)  ondansetron Injectable 4 milliGRAM(s) IV Push every 8 hours PRN Nausea and/or Vomiting      Vital Signs Last 24 Hrs  T(C): 36.8 (08 Nov 2019 05:26), Max: 36.8 (07 Nov 2019 12:59)  T(F): 98.2 (08 Nov 2019 05:26), Max: 98.3 (07 Nov 2019 12:59)  HR: 54 (08 Nov 2019 05:58) (50 - 80)  BP: 117/50 (08 Nov 2019 05:26) (101/53 - 131/65)  BP(mean): --  RR: 17 (08 Nov 2019 05:26) (17 - 18)  SpO2: 99% (08 Nov 2019 05:26) (99% - 99%)    ________________________________________________  PHYSICAL EXAM:  GENERAL: NAD,  HEAD:  Atraumatic, Normocephalic  EYES:  conjunctiva and sclera clear  NECK: Supple, No JVD, No LAD  CHEST/LUNG: Clear  to auscultation, Clear to percussion bilaterally; No rales, rhonchi, wheezing, or rubs  HEART: Regular rate and rhythm; No murmurs, rubs, or gallops  ABDOMEN: Soft, Nontender, Nondistended; Bowel sounds present  : Rosario catheter  NERVOUS SYSTEM:  Alert & Oriented X3,  EXTREMITIES: B/L Knee swollen R> L,  2+ Peripheral Pulses, No clubbing, cyanosis, or edema  SKIN; warm, dry    _________________________________________________  LABS:                        9.3    11.34 )-----------( 198      ( 08 Nov 2019 04:40 )             28.0     11-08    132<L>  |  102  |  27<H>  ----------------------------<  86  4.2   |  23  |  1.12    Ca    8.1<L>      08 Nov 2019 04:40  Phos  2.8     11-08  Mg     2.0     11-08    TPro  5.4<L>  /  Alb  2.2<L>  /  TBili  0.3  /  DBili  x   /  AST  18  /  ALT  30  /  AlkPhos  66  11-08      POCT Blood Glucose.: 189 mg/dL (08 Nov 2019 11:42)  POCT Blood Glucose.: 111 mg/dL (08 Nov 2019 08:13)  POCT Blood Glucose.: 133 mg/dL (07 Nov 2019 21:16)  POCT Blood Glucose.: 144 mg/dL (07 Nov 2019 16:17)  POCT Blood Glucose.: 213 mg/dL (07 Nov 2019 12:03)        RADIOLOGY & ADDITIONAL TESTS: No new            Plan of care was discussed with patient and /or primary care giver; all questions and concerns were addressed and care was aligned with patient's wishes.

## 2019-11-09 ENCOUNTER — TRANSCRIPTION ENCOUNTER (OUTPATIENT)
Age: 84
End: 2019-11-09

## 2019-11-09 VITALS
RESPIRATION RATE: 17 BRPM | OXYGEN SATURATION: 100 % | DIASTOLIC BLOOD PRESSURE: 94 MMHG | HEART RATE: 65 BPM | TEMPERATURE: 99 F | SYSTOLIC BLOOD PRESSURE: 117 MMHG

## 2019-11-09 LAB
-  AMIKACIN: SIGNIFICANT CHANGE UP
-  AMPICILLIN/SULBACTAM: SIGNIFICANT CHANGE UP
-  AMPICILLIN: SIGNIFICANT CHANGE UP
-  AZTREONAM: SIGNIFICANT CHANGE UP
-  CEFAZOLIN: SIGNIFICANT CHANGE UP
-  CEFEPIME: SIGNIFICANT CHANGE UP
-  CEFOXITIN: SIGNIFICANT CHANGE UP
-  CEFTRIAXONE: SIGNIFICANT CHANGE UP
-  CIPROFLOXACIN: SIGNIFICANT CHANGE UP
-  ERTAPENEM: SIGNIFICANT CHANGE UP
-  GENTAMICIN: SIGNIFICANT CHANGE UP
-  IMIPENEM: SIGNIFICANT CHANGE UP
-  LEVOFLOXACIN: SIGNIFICANT CHANGE UP
-  MEROPENEM: SIGNIFICANT CHANGE UP
-  NITROFURANTOIN: SIGNIFICANT CHANGE UP
-  PIPERACILLIN/TAZOBACTAM: SIGNIFICANT CHANGE UP
-  TIGECYCLINE: SIGNIFICANT CHANGE UP
-  TOBRAMYCIN: SIGNIFICANT CHANGE UP
-  TRIMETHOPRIM/SULFAMETHOXAZOLE: SIGNIFICANT CHANGE UP
ANION GAP SERPL CALC-SCNC: 6 MMOL/L — SIGNIFICANT CHANGE UP (ref 5–17)
BASOPHILS # BLD AUTO: 0.04 K/UL — SIGNIFICANT CHANGE UP (ref 0–0.2)
BASOPHILS NFR BLD AUTO: 0.5 % — SIGNIFICANT CHANGE UP (ref 0–2)
BUN SERPL-MCNC: 23 MG/DL — HIGH (ref 7–18)
CALCIUM SERPL-MCNC: 8.7 MG/DL — SIGNIFICANT CHANGE UP (ref 8.4–10.5)
CHLORIDE SERPL-SCNC: 106 MMOL/L — SIGNIFICANT CHANGE UP (ref 96–108)
CO2 SERPL-SCNC: 26 MMOL/L — SIGNIFICANT CHANGE UP (ref 22–31)
CREAT SERPL-MCNC: 1.05 MG/DL — SIGNIFICANT CHANGE UP (ref 0.5–1.3)
CULTURE RESULTS: SIGNIFICANT CHANGE UP
EOSINOPHIL # BLD AUTO: 0.45 K/UL — SIGNIFICANT CHANGE UP (ref 0–0.5)
EOSINOPHIL NFR BLD AUTO: 5.9 % — SIGNIFICANT CHANGE UP (ref 0–6)
GLUCOSE BLDC GLUCOMTR-MCNC: 101 MG/DL — HIGH (ref 70–99)
GLUCOSE BLDC GLUCOMTR-MCNC: 175 MG/DL — HIGH (ref 70–99)
GLUCOSE SERPL-MCNC: 89 MG/DL — SIGNIFICANT CHANGE UP (ref 70–99)
HCT VFR BLD CALC: 32.2 % — LOW (ref 34.5–45)
HGB BLD-MCNC: 10.4 G/DL — LOW (ref 11.5–15.5)
IMM GRANULOCYTES NFR BLD AUTO: 0.8 % — SIGNIFICANT CHANGE UP (ref 0–1.5)
LYMPHOCYTES # BLD AUTO: 1.58 K/UL — SIGNIFICANT CHANGE UP (ref 1–3.3)
LYMPHOCYTES # BLD AUTO: 20.8 % — SIGNIFICANT CHANGE UP (ref 13–44)
MCHC RBC-ENTMCNC: 29.1 PG — SIGNIFICANT CHANGE UP (ref 27–34)
MCHC RBC-ENTMCNC: 32.3 GM/DL — SIGNIFICANT CHANGE UP (ref 32–36)
MCV RBC AUTO: 90.2 FL — SIGNIFICANT CHANGE UP (ref 80–100)
METHOD TYPE: SIGNIFICANT CHANGE UP
MONOCYTES # BLD AUTO: 0.53 K/UL — SIGNIFICANT CHANGE UP (ref 0–0.9)
MONOCYTES NFR BLD AUTO: 7 % — SIGNIFICANT CHANGE UP (ref 2–14)
NEUTROPHILS # BLD AUTO: 4.94 K/UL — SIGNIFICANT CHANGE UP (ref 1.8–7.4)
NEUTROPHILS NFR BLD AUTO: 65 % — SIGNIFICANT CHANGE UP (ref 43–77)
NRBC # BLD: 0 /100 WBCS — SIGNIFICANT CHANGE UP (ref 0–0)
ORGANISM # SPEC MICROSCOPIC CNT: SIGNIFICANT CHANGE UP
PLATELET # BLD AUTO: 232 K/UL — SIGNIFICANT CHANGE UP (ref 150–400)
POTASSIUM SERPL-MCNC: 4.3 MMOL/L — SIGNIFICANT CHANGE UP (ref 3.5–5.3)
POTASSIUM SERPL-SCNC: 4.3 MMOL/L — SIGNIFICANT CHANGE UP (ref 3.5–5.3)
RBC # BLD: 3.57 M/UL — LOW (ref 3.8–5.2)
RBC # FLD: 14.2 % — SIGNIFICANT CHANGE UP (ref 10.3–14.5)
SODIUM SERPL-SCNC: 138 MMOL/L — SIGNIFICANT CHANGE UP (ref 135–145)
SPECIMEN SOURCE: SIGNIFICANT CHANGE UP
WBC # BLD: 7.6 K/UL — SIGNIFICANT CHANGE UP (ref 3.8–10.5)
WBC # FLD AUTO: 7.6 K/UL — SIGNIFICANT CHANGE UP (ref 3.8–10.5)

## 2019-11-09 PROCEDURE — 85027 COMPLETE CBC AUTOMATED: CPT

## 2019-11-09 PROCEDURE — 76705 ECHO EXAM OF ABDOMEN: CPT

## 2019-11-09 PROCEDURE — 84100 ASSAY OF PHOSPHORUS: CPT

## 2019-11-09 PROCEDURE — 84443 ASSAY THYROID STIM HORMONE: CPT

## 2019-11-09 PROCEDURE — 83735 ASSAY OF MAGNESIUM: CPT

## 2019-11-09 PROCEDURE — 82746 ASSAY OF FOLIC ACID SERUM: CPT

## 2019-11-09 PROCEDURE — 81001 URINALYSIS AUTO W/SCOPE: CPT

## 2019-11-09 PROCEDURE — 86900 BLOOD TYPING SEROLOGIC ABO: CPT

## 2019-11-09 PROCEDURE — 96375 TX/PRO/DX INJ NEW DRUG ADDON: CPT

## 2019-11-09 PROCEDURE — 99239 HOSP IP/OBS DSCHRG MGMT >30: CPT

## 2019-11-09 PROCEDURE — 93005 ELECTROCARDIOGRAM TRACING: CPT

## 2019-11-09 PROCEDURE — 82962 GLUCOSE BLOOD TEST: CPT

## 2019-11-09 PROCEDURE — 86850 RBC ANTIBODY SCREEN: CPT

## 2019-11-09 PROCEDURE — 87186 SC STD MICRODIL/AGAR DIL: CPT

## 2019-11-09 PROCEDURE — 80061 LIPID PANEL: CPT

## 2019-11-09 PROCEDURE — 85610 PROTHROMBIN TIME: CPT

## 2019-11-09 PROCEDURE — 96374 THER/PROPH/DIAG INJ IV PUSH: CPT

## 2019-11-09 PROCEDURE — 87086 URINE CULTURE/COLONY COUNT: CPT

## 2019-11-09 PROCEDURE — 82306 VITAMIN D 25 HYDROXY: CPT

## 2019-11-09 PROCEDURE — 83036 HEMOGLOBIN GLYCOSYLATED A1C: CPT

## 2019-11-09 PROCEDURE — 74177 CT ABD & PELVIS W/CONTRAST: CPT

## 2019-11-09 PROCEDURE — 82607 VITAMIN B-12: CPT

## 2019-11-09 PROCEDURE — 71045 X-RAY EXAM CHEST 1 VIEW: CPT

## 2019-11-09 PROCEDURE — 83690 ASSAY OF LIPASE: CPT

## 2019-11-09 PROCEDURE — 80048 BASIC METABOLIC PNL TOTAL CA: CPT

## 2019-11-09 PROCEDURE — 87040 BLOOD CULTURE FOR BACTERIA: CPT

## 2019-11-09 PROCEDURE — 86901 BLOOD TYPING SEROLOGIC RH(D): CPT

## 2019-11-09 PROCEDURE — 84484 ASSAY OF TROPONIN QUANT: CPT

## 2019-11-09 PROCEDURE — 99285 EMERGENCY DEPT VISIT HI MDM: CPT | Mod: 25

## 2019-11-09 PROCEDURE — 85730 THROMBOPLASTIN TIME PARTIAL: CPT

## 2019-11-09 PROCEDURE — 99232 SBSQ HOSP IP/OBS MODERATE 35: CPT

## 2019-11-09 PROCEDURE — 36415 COLL VENOUS BLD VENIPUNCTURE: CPT

## 2019-11-09 PROCEDURE — 80053 COMPREHEN METABOLIC PANEL: CPT

## 2019-11-09 PROCEDURE — 97162 PT EVAL MOD COMPLEX 30 MIN: CPT

## 2019-11-09 RX ORDER — SENNA PLUS 8.6 MG/1
2 TABLET ORAL
Qty: 60 | Refills: 0
Start: 2019-11-09 | End: 2019-12-08

## 2019-11-09 RX ORDER — POLYETHYLENE GLYCOL 3350 17 G/17G
17 POWDER, FOR SOLUTION ORAL
Qty: 255 | Refills: 0
Start: 2019-11-09 | End: 2019-12-08

## 2019-11-09 RX ORDER — CEFUROXIME AXETIL 250 MG
1 TABLET ORAL
Qty: 6 | Refills: 0
Start: 2019-11-09 | End: 2019-11-11

## 2019-11-09 RX ORDER — METRONIDAZOLE 500 MG
1 TABLET ORAL
Qty: 9 | Refills: 0
Start: 2019-11-09 | End: 2019-11-11

## 2019-11-09 RX ADMIN — Medication 500 MILLIGRAM(S): at 05:47

## 2019-11-09 RX ADMIN — Medication 4 MILLIGRAM(S): at 05:48

## 2019-11-09 RX ADMIN — ENOXAPARIN SODIUM 40 MILLIGRAM(S): 100 INJECTION SUBCUTANEOUS at 11:19

## 2019-11-09 RX ADMIN — CEFTRIAXONE 100 MILLIGRAM(S): 500 INJECTION, POWDER, FOR SOLUTION INTRAMUSCULAR; INTRAVENOUS at 11:19

## 2019-11-09 RX ADMIN — Medication 81 MILLIGRAM(S): at 11:19

## 2019-11-09 RX ADMIN — Medication 100 MILLIGRAM(S): at 14:04

## 2019-11-09 RX ADMIN — Medication 1: at 12:09

## 2019-11-09 RX ADMIN — LISINOPRIL 10 MILLIGRAM(S): 2.5 TABLET ORAL at 05:47

## 2019-11-09 RX ADMIN — Medication 500 MILLIGRAM(S): at 14:04

## 2019-11-09 RX ADMIN — Medication 25 MILLIGRAM(S): at 05:47

## 2019-11-09 RX ADMIN — Medication 100 MILLIGRAM(S): at 05:48

## 2019-11-09 NOTE — CHART NOTE - NSCHARTNOTEFT_GEN_A_CORE
pt seen in am  Denies any abdominal pain. Slight discomfort at times  On reg diet    ICU Vital Signs Last 24 Hrs  T(C): 37.1 (09 Nov 2019 14:51), Max: 37.1 (09 Nov 2019 14:51)  T(F): 98.7 (09 Nov 2019 14:51), Max: 98.7 (09 Nov 2019 14:51)  HR: 65 (09 Nov 2019 14:51) (61 - 65)  BP: 117/94 (09 Nov 2019 14:51) (117/94 - 178/56)  BP(mean): --  ABP: --  ABP(mean): --  RR: 17 (09 Nov 2019 14:51) (17 - 18)  SpO2: 100% (09 Nov 2019 14:51) (98% - 100%)                            10.4   7.60  )-----------( 232      ( 09 Nov 2019 06:48 )             32.2   11-09    138  |  106  |  23<H>  ----------------------------<  89  4.3   |  26  |  1.05    Ca    8.7      09 Nov 2019 06:48  Phos  2.8     11-08  Mg     2.0     11-08    TPro  5.4<L>  /  Alb  2.2<L>  /  TBili  0.3  /  DBili  x   /  AST  18  /  ALT  30  /  AlkPhos  66  11-08    abdomen soft, non tender  Surgically - no clinical signs of cholecystitis

## 2019-11-09 NOTE — DISCHARGE NOTE NURSING/CASE MANAGEMENT/SOCIAL WORK - PATIENT PORTAL LINK FT
You can access the FollowMyHealth Patient Portal offered by [facility] by registering at the following website: [email or web address]. By joining [employer]’s FollowMyHealth portal, you will also be able to view your health information using other applications (apps) compatible with our system.

## 2019-11-11 LAB
CULTURE RESULTS: SIGNIFICANT CHANGE UP
SPECIMEN SOURCE: SIGNIFICANT CHANGE UP

## 2021-02-27 NOTE — PROGRESS NOTE ADULT - PROBLEM/PLAN-9
Patient brought in via ems c/o SOB. Patient was attempting to give himself a nebulizer per EMS. Pt tripoding, leaning over when EMS arrived. O2 at 78 at EMS arrival. NRB applied, improved to 94%.  Pt hx of COPD
DISPLAY PLAN FREE TEXT

## 2021-05-12 NOTE — ED ADULT TRIAGE NOTE - NS ED NURSE BANDS TYPE
Name band; Alert-The patient is alert, awake and responds to voice. The patient is oriented to time, place, and person. The triage nurse is able to obtain subjective information.

## 2021-06-18 NOTE — ED ADULT TRIAGE NOTE - NS ED NURSE AMBULANCES
Patient Instructions by Mor Chaidez MD at 10/29/2020 11:20 AM     Author: Mor Chaidez MD Service: -- Author Type: Physician    Filed: 10/29/2020 11:39 AM Encounter Date: 10/29/2020 Status: Signed    : Mor Chaidez MD (Physician)         Patient Education   Alcohol Use   Many people can enjoy a glass of wine or beer without any negative consequences to their health. According to the Centers for Disease Control and Prevention (CDC), having one or fewer drinks per day for women and two or fewer per day for men is considered moderate drinking.     When people drink more than moderately, it can become concerning. Excessive drinking is defined as consuming 15 drinks or more per week for men and 8 drinks or more per week for women. There are various health problems associated with excessive drinking, which include:    Damage to vital organs like the heart, brain, liver and pancreas    Harm to the digestive tract    Weaken the immune system    Higher risk for heart disease and cancer       Patient Education   Preventing Falls in the Home  As you get older, falls are more likely. Thats because your reaction time slows. Your muscles and joints may also get stiffer, making them less flexible. Illness, medications, and vision changes can also affect your balance. A fall could leave you unable to live on your own. To make your home safer, follow these tips:    Floors    Put nonskid pads under area rugs.    Remove throw rugs.    Replace worn floor coverings.    Tack carpets firmly to each step on carpeted stairs. Put nonskid strips on the edges of uncarpeted stairs.    Keep floors and stairs free of clutter and cords.    Arrange furniture so there are clear pathways.    Clean up any spills right away.    Bathrooms    Install grab bars in the tub or shower.    Apply nonskid strips or put a nonskid rubber mat in the tub or shower.    Sit on a bath chair to bathe.    Use bathmats with nonskid  backing.    Lighting    Keep a flashlight in each room.    Put a nightlight along the pathway between the bedroom and the bathroom.    0810-0319 [employer]. [address] [place] [address], [place] [address]. All rights reserved. This information is not intended as a substitute for professional medical care. Always follow your healthcare professional's instructions.           Advance Directive  Patients advance directive was discussed and I am comfortable with the patients wishes.  Patient Education   Personalized Prevention Plan  You are due for the preventive services outlined below.  Your care team is available to assist you in scheduling these services.  If you have already completed any of these items, please share that information with your care team to update in your medical record.  Health Maintenance   Topic Date Due   ? HEPATITIS C SCREENING  1943   ? DXA SCAN  01/09/2019   ? Pneumococcal Vaccine: Pediatrics (0 to 5 Years) and At-Risk Patients (6 to 64 Years) (2 of 3 - PPSV23) 11/03/2020 (Originally 3/13/2019)   ? Pneumococcal Vaccine: 65+ Years (2 of 2 - PPSV23) 11/03/2020 (Originally 1/16/2020)   ? MEDICARE ANNUAL WELLNESS VISIT  10/29/2021   ? FALL RISK ASSESSMENT  10/29/2021   ? LIPID  12/12/2021   ? ADVANCE CARE PLANNING  03/02/2022   ? COLORECTAL CANCER SCREENING  09/10/2028   ? TD 18+ HE  03/04/2029   ? INFLUENZA VACCINE RULE BASED  Completed   ? ZOSTER VACCINES  Completed   ? HEPATITIS B VACCINES  Aged Out              [facility]

## 2021-11-23 NOTE — ED ADULT NURSE NOTE - SUICIDE SCREENING QUESTION 1
Phone message left regarding scheduled upcoming GI procedure.     Place of procedure: [facility]  Arrival Time: 0930  Procedure Time:0815  Prep Type: miralax    
No

## 2021-11-23 NOTE — ED ADULT NURSE NOTE - CAS DISCH CONDITION
well developed, well nourished , in no acute distress , ambulating without difficulty , normal communication ability Stable

## 2022-09-20 NOTE — DISCHARGE NOTE NURSING/CASE MANAGEMENT/SOCIAL WORK - NSFLUVACAGEDISCH_IMM_ALL_CORE
Adult Colchicine Pregnancy And Lactation Text: This medication is Pregnancy Category C and isn't considered safe during pregnancy. It is excreted in breast milk.

## 2022-09-26 NOTE — ED ADULT NURSE NOTE - PAIN: PRESENCE, MLM
complains of pain/discomfort Propranolol Counseling:  I discussed with the patient the risks of propranolol including but not limited to low heart rate, low blood pressure, low blood sugar, restlessness and increased cold sensitivity. They should call the office if they experience any of these side effects.

## 2023-07-22 NOTE — ED ADULT TRIAGE NOTE - CHIEF COMPLAINT QUOTE
biba with c/o blocked stewart catheter,was inserted at 5pm and since then there was no urine in the bag as per ems
22-Jul-2023 13:36

## 2023-07-24 NOTE — PROGRESS NOTE ADULT - PROVIDER SPECIALTY LIST ADULT
Called patient and LMOM to see if patient would like to switch from Dr. Zaki Jennings to Dr. Maide Webb. Will wait for patient to call us back. Internal Medicine

## 2023-10-22 RX ORDER — METOPROLOL TARTRATE 50 MG
0 TABLET ORAL
Qty: 0 | Refills: 0 | DISCHARGE

## 2023-10-22 RX ORDER — TRAMADOL HYDROCHLORIDE 50 MG/1
0 TABLET ORAL
Qty: 0 | Refills: 0 | DISCHARGE

## 2023-10-22 RX ORDER — ASPIRIN/CALCIUM CARB/MAGNESIUM 324 MG
0 TABLET ORAL
Qty: 0 | Refills: 0 | DISCHARGE

## 2023-10-22 RX ORDER — ASCORBIC ACID 60 MG
50 TABLET,CHEWABLE ORAL
Qty: 0 | Refills: 0 | DISCHARGE

## 2023-10-22 RX ORDER — LISINOPRIL 2.5 MG/1
0 TABLET ORAL
Qty: 0 | Refills: 0 | DISCHARGE

## 2023-10-22 RX ORDER — UBIDECARENONE 100 MG
1 CAPSULE ORAL
Qty: 0 | Refills: 0 | DISCHARGE

## 2023-10-22 RX ORDER — L.ACIDOPH/B.ANIMALIS/B.LONGUM 15B CELL
1 CAPSULE ORAL
Qty: 0 | Refills: 0 | DISCHARGE

## 2023-10-22 RX ORDER — METOPROLOL TARTRATE 50 MG
1 TABLET ORAL
Qty: 0 | Refills: 0 | DISCHARGE

## 2023-10-22 RX ORDER — GABAPENTIN 400 MG/1
0 CAPSULE ORAL
Qty: 0 | Refills: 0 | DISCHARGE

## 2024-01-18 NOTE — ED ADULT NURSE NOTE - CAS DISCH ACCOMP BY
Nnamdi Cameron,     If you are due for any health screening(s) below please notify me so we can arrange them to be ordered and scheduled. Most healthy patients at your age complete them, but you are free to accept or refuse.     If you can't do it, I'll definitely understand. If you can, I'd certainly appreciate it!    All of your core healthy metrics are met.                      Family